# Patient Record
Sex: FEMALE | Race: WHITE | NOT HISPANIC OR LATINO | Employment: FULL TIME | URBAN - METROPOLITAN AREA
[De-identification: names, ages, dates, MRNs, and addresses within clinical notes are randomized per-mention and may not be internally consistent; named-entity substitution may affect disease eponyms.]

---

## 2017-07-18 ENCOUNTER — ALLSCRIPTS OFFICE VISIT (OUTPATIENT)
Dept: OTHER | Facility: OTHER | Age: 47
End: 2017-07-18

## 2017-10-04 ENCOUNTER — ALLSCRIPTS OFFICE VISIT (OUTPATIENT)
Dept: OTHER | Facility: OTHER | Age: 47
End: 2017-10-04

## 2017-10-04 DIAGNOSIS — Z12.31 ENCOUNTER FOR SCREENING MAMMOGRAM FOR MALIGNANT NEOPLASM OF BREAST: ICD-10-CM

## 2017-10-17 ENCOUNTER — LAB CONVERSION - ENCOUNTER (OUTPATIENT)
Dept: OTHER | Facility: OTHER | Age: 47
End: 2017-10-17

## 2017-10-17 LAB
CHOLEST SERPL-MCNC: 177 MG/DL
GLUCOSE SERPL-MCNC: 93 MG/DL
HDLC SERPL-MCNC: 56 MG/DL
LDLC SERPL CALC-MCNC: 111 MG/DL
TRIGL SERPL-MCNC: 49 MG/DL

## 2017-12-01 ENCOUNTER — GENERIC CONVERSION - ENCOUNTER (OUTPATIENT)
Dept: FAMILY MEDICINE CLINIC | Facility: CLINIC | Age: 47
End: 2017-12-01

## 2017-12-04 DIAGNOSIS — R92.8 OTHER ABNORMAL AND INCONCLUSIVE FINDINGS ON DIAGNOSTIC IMAGING OF BREAST: ICD-10-CM

## 2017-12-12 ENCOUNTER — GENERIC CONVERSION - ENCOUNTER (OUTPATIENT)
Dept: FAMILY MEDICINE CLINIC | Facility: CLINIC | Age: 47
End: 2017-12-12

## 2018-01-11 NOTE — RESULT NOTES
Verified Results  (1923 [facility] TgAb+Thyroglobulin,DESTINEY or NILSA [id number] 10:32AM Kenya Patel courtesy copy of this report has been sent to  [facility]  Test Name Result Flag Reference   Thyroglobulin Antibody Thyroglobulin Ab 1936 8 IU/mL H 0 0-0 9   Thyroglobulin Antibody measured by [employer]     (LC) Thyroglobulin by NILSA [id number] 10:32AM Donna Garcia     Test Name Result Flag Reference   Thyroglobulin by NILSA Thyroglobulin 7 1 ng/mL     Reference Range:  Pubertal Children  and Adults: <40  According to the [facility] of Clinical Biochemistry,  the reference interval for Thyroglobulin (TG) should be  related to euthyroid patients and not for patients who  underwent thyroidectomy  TG reference intervals for these  patients depend on the residual mass of the thyroid tissue  left after surgery  Establishing a post-operative baseline  is recommended  The assay quantitation limit is 2 0 ng/mL         Discussion/Summary   follow up with endocrinolgy as discussed for further work up and treatment plan

## 2018-01-12 ENCOUNTER — GENERIC CONVERSION - ENCOUNTER (OUTPATIENT)
Dept: FAMILY MEDICINE CLINIC | Facility: CLINIC | Age: 48
End: 2018-01-12

## 2018-01-12 NOTE — RESULT NOTES
Verified Results  US THYROID [id number] 01:24PM Gonzalez Garcia     Test Name Result Flag Reference   US THYROID (Report)     THYROID ULTRASOUND     INDICATION: Hypothyroidism     COMPARISON: None  TECHNIQUE:  Ultrasound of the thyroid was performed with a high frequency linear transducer in transverse and sagittal planes including volumetric imaging sweeps as well as traditional still imaging technique  FINDINGS:   Both lobes are enlarged and heterogeneous  There are also mildly hypervascular  Right gland: 5 1 x 2 1 x 1 9 cm  Marked heterogeneity suggests nodules although I do not believe a discrete nodule is present  Left gland: 5 0 x 1 8 x 1 7 cm  No dominant nodules  Isthmus: 0 8 cm in AP dimension  No dominant nodules  Markedly heterogeneous thyroid gland compatible with a goiter  Mild thyroidomegaly

## 2018-01-13 NOTE — RESULT NOTES
Verified Results  (1) T3 TOTAL [id number] 10:32AM Delmonico, Cathye Fleischer     Test Name Result Flag Reference   Triiodothyronine (T3) 117 ng/dL       (1) TSH [id number] 10:32AM Delmonico, Cathye Fleischer     Test Name Result Flag Reference   TSH 2 650 uIU/mL  0 450-4 500     [facility]) Thyroid [facility] Profile [id number] 10:32AM Delmonico, Cathye Fleischer     Test Name Result Flag Reference   TSH 2 400 uIU/mL  0 450-4 500     [facility]) Thyroxine (T4) Free, [facility] [id number] 10:32AM Delmonico, Cathye Fleischer     Test Name Result Flag Reference   T4,Free(Direct) 1 79 ng/dL H 0 82-1 77     (LC) Thyroid Antibodies [id number] 10:32AM Delmonico, Cathye Fleischer     Test Name Result Flag Reference   Thyroid Peroxidase (TPO) Ab 384 IU/mL H 0-34       Discussion/Summary   Thyroid tests abnormal  follow with endocrinology for further evaluation as discussed

## 2018-01-14 VITALS
SYSTOLIC BLOOD PRESSURE: 124 MMHG | HEART RATE: 76 BPM | DIASTOLIC BLOOD PRESSURE: 90 MMHG | RESPIRATION RATE: 16 BRPM | HEIGHT: 65 IN | WEIGHT: 163 LBS | BODY MASS INDEX: 27.16 KG/M2 | TEMPERATURE: 98 F

## 2018-01-14 NOTE — PROGRESS NOTES
Assessment    1  Encounter for routine history and physical exam in female (V70 0) (Z00 00)    Plan  Acute maxillary sinusitis    · Azithromycin 250 MG Oral Tablet; Take as directed per package instructions  Encounter for screening for cardiovascular disorders, Hypothyroidism    · (1) COMPREHENSIVE METABOLIC PANEL; Status:Active; Requested for:04Oct2017;    · (1) LIPID PANEL, FASTING; Status:Active; Requested for:04Oct2017;    · (1) TSH; Status:Canceled;   Encounter for screening mammogram for malignant neoplasm of breast    · * MAMMO SCREENING BILATERAL W CAD; Status:Hold For - Scheduling,Retrospective  By Protocol Authorization; Requested OBE:[id number]; Health Maintenance    · Begin a limited exercise program ; Status:Complete;   Done: 07PZO6853   · Brush your teeth 3 times a day and floss at least once a day ; Status:Complete;   Done:  04ZNV2334   · Decreasing the stress in your life may help your condition improve ; Status:Complete;    Done: 28JXD1272   · Some eating tips that can help you lose weight ; Status:Complete;   Done: 59HRX8948   · Vitamins can help you get daily requirements that your diet may not be giving you ;  Status:Complete;   Done: 49IZM2586   · We recommend that you follow these steps to lower your risk of osteoporosis  ;  Status:Complete;   Done: 31KRA0909   · Call [phone] if: You find a new or different kind of lump in your breast ;  Status:Complete;   Done: 72MWS4222   · Call [phone] if: You have any warning signs of skin cancer ; Status:Complete;    Done: 88LMU0610  Hypothyroidism    · Follow-up visit in 6 weeks Evaluation and Treatment  Follow-up  Status: Hold For -  Scheduling  Requested for: 04Oct2017  Situational anxiety    · DULoxetine HCl - 30 MG Oral Capsule Delayed Release Particles; TAKE ONE  CAPSULE BY MOUTH ONCE DAILY    Discussion/Summary  healthy adult female Currently, she eats a healthy diet   Breast cancer screening: the risks and benefits of breast cancer screening were discussed and mammogram has been ordered  Osteoporosis screening: the risks and benefits of osteoporosis screening were discussed  The risks and benefits of immunizations were discussed  Advice and education were given regarding calcium supplements and vitamin D supplements  Patient discussion: discussed with the patient  Labs as ordered  Continue medications  Gi follow up GYN pap test as needed  Chief Complaint  Pt here for a CPE  sp/cma      History of Present Illness  HM, Adult Female: The patient is being seen for a health maintenance evaluation  The last health maintenance visit was 2 year(s) ago  General Health: She has regular dental visits  She complains of vision problems  She denies hearing loss  Lifestyle:  She does not use tobacco  She denies drug use  Reproductive health: the patient is postmenopausal   she is sexually active  hysterectomy  Screening: Additional History:  stress depression  Review of Systems    Eyes: no eyesight problems  ENT: nasal discharge  Cardiovascular: no palpitations  Respiratory: no cough and no wheezing  Genitourinary: no pelvic pain  Endocrine: no hot flashes  Hematologic/Lymphatic: no swollen glands  Other Symptoms: stress anxiety  Active Problems    1  Acute maxillary sinusitis (461 0) (J01 00)   2  Acute upper respiratory infection (465 9) (J06 9)   3  Allergic rhinitis (477 9) (J30 9)   4  Amenorrhea (626 0) (N91 2)   5  Anxiety disorder (300 00) (F41 9)   6  Arthropathy (716 90) (M12 9)   7  Asthma (493 90) (J45 909)   8  Asthma with exacerbation (493 92) (J45 901)   9  Depression (311) (F32 9)   10  Diffuse nontoxic goiter (240 9) (E04 0)   11  Encounter for routine pelvic examination (V72 31) (Z01 419)   12  Encounter for screening for cardiovascular disorders (V81 2) (Z13 6)   13  Encounter for screening mammogram for malignant neoplasm of breast (V76 12)    (Z12 31)   14   Herpes gingivostomatitis (054 2) (B00 2)   15  Herpes simplex infection (054 9) (B00 9)   16  Hypothyroidism (244 9) (E03 9)   17  Need for influenza vaccination (V04 81) (Z23)   18  Visit for suture removal (V58 32) (Z48 02)    Past Medical History    · Acute bronchitis (466 0) (J20 9)   · Acute maxillary sinusitis (461 0) (J01 00)   · Acute maxillary sinusitis (461 0) (J01 00)   · History of Cellulitis of trunk (682 2) (L03 319)   · History of Extrinsic asthma, with acute exacerbation (493 02) (J45 901)   · History of acute bronchitis (V12 69) (Z87 09)   · History of acute sinusitis (V12 69) (Z87 09)   · History of acute sinusitis (V12 69) (Z87 09)   · History of acute sinusitis (V12 69) (Z87 09)   · History of acute sinusitis (V12 69) (Z87 09)   · History of acute sinusitis (V12 69) (Z87 09)   · History of lymphadenopathy (V13 89) (X61 045)   · History of neoplasm of uncertain behavior of skin (V13 3) (Z86 03)   · History of Mastodynia (611 71) (N64 4)   · History of Open Wound Of The Hand (882 0)   · History of Other muscle spasm (728 85) (Q61 062)   · History of Urinary Tract Infection (V13 02)    Surgical History    · History of Hysterectomy   · History of Nasal Septal Deviation Repair    Family History  Mother    · Family history of hypothyroidism (V18 19) (Z83 49)   · Family history of Rheumatoid Arthritis    Social History    · Never a smoker   · Denied: Never A Smoker    Current Meds   1  Allegra 180 MG TABS; take 1 tablet daily as needed Recorded   2  Levocetirizine Dihydrochloride 5 MG Oral Tablet; 1 every day; Therapy: 49WFJ0859 to (Last Rx:10Jan2013)  Requested for: 33ZVO4165 Ordered   3  Levothyroxine Sodium 150 MCG Oral Tablet; 1 every day; Therapy: 54RLF0663 to (Evaluate:18Jun2016)  Requested for: 12Jan2016; Last   Rx:12Kyf4959 Ordered   4  ProAir  (90 Base) MCG/ACT Inhalation Aerosol Solution; 2 puffs QID Prn;   Therapy: 45ODK7154 to (Last UN:42VXG8306)  Requested for: 51WWN9381 Ordered    Allergies    1   No Known Drug Allergies    Vitals   Recorded: 45FLX7528 05:45PM Recorded: 53MKK0922 05:31PM   Temperature  98 F   Heart Rate  76   Respiration  16   Systolic 127 163   Diastolic 90 98   Height  5 ft 5 in   Weight  163 lb    BMI Calculated  27 12   BSA Calculated  1 81   LMP  73Ltp8209     Physical Exam    Constitutional   General appearance: No acute distress, well appearing and well nourished  Head and Face   Head and face: Normal     Palpation of the face and sinuses: No sinus tenderness  Eyes   Conjunctiva and lids: No swelling, erythema or discharge  Pupils and irises: Equal, round, reactive to light  Ophthalmoscopic examination: Normal fundi and optic discs  Ears, Nose, Mouth, and Throat   Otoscopic examination: Tympanic membranes translucent with normal light reflex  Canals patent without erythema  Hearing: Normal     Lips, teeth, and gums: Normal, good dentition  Oropharynx: Normal with no erythema, edema, exudate or lesions  Neck fullness R>L  Pulmonary   Auscultation of lungs: Clear to auscultation  Cardiovascular   Carotid pulses: 2+ bilaterally  Pedal pulses: 2+ bilaterally  Examination of extremities for edema and/or varicosities: Normal     Abdomen   Abdomen: Non-tender, no masses  Liver and spleen: No hepatomegaly or splenomegaly  Musculoskeletal   Gait and station: Normal     Digits and nails: Normal without clubbing or cyanosis  Range of motion: Normal     Stability: Normal     Muscle strength/tone: Normal     Skin fibroma's lentigines back chest    Palpation of skin and subcutaneous tissue: Normal turgor  Psychiatric   Judgment and insight: Normal     Orientation to person, place, and time: Normal     Recent and remote memory: Intact  Mood and affect: Normal        Procedure    Procedure: Visual Acuity Test    Indication: routine screening  Inforrmation supplied by sp/cma     Results: 20/15 in both eyes without corrective device, 20/20 in the right eye without corrective device, 20/20 in the left eye without corrective device   Color vision was reported by sp/cma and the results were normal       Signatures   Electronically signed by : Belinda Bridges MD; Oct  4 2017  6:02PM EST                       (Author)

## 2018-01-15 VITALS
RESPIRATION RATE: 18 BRPM | HEIGHT: 65 IN | WEIGHT: 166 LBS | SYSTOLIC BLOOD PRESSURE: 144 MMHG | HEART RATE: 80 BPM | BODY MASS INDEX: 27.66 KG/M2 | DIASTOLIC BLOOD PRESSURE: 80 MMHG | TEMPERATURE: 97.7 F

## 2018-01-15 NOTE — MISCELLANEOUS
Message  patient here with her parents  Feeling sick for the past week  She has sinus pressure and a sore throat        Plan  Acute maxillary sinusitis    · Azithromycin 250 MG Oral Tablet; 2 pills today, then 1 pill daily for 4 days; advised  to hold his cholesterol medication while taking the antibiotic    Signatures   Electronically signed by : Pat Trujillo DO; Dec 13 2016 10:25AM EST                       (Author)

## 2018-02-12 DIAGNOSIS — J40 BRONCHITIS: Primary | ICD-10-CM

## 2018-02-12 RX ORDER — AZITHROMYCIN 250 MG/1
250 TABLET, FILM COATED ORAL SEE ADMIN INSTRUCTIONS
Qty: 6 TABLET | Refills: 0 | Status: SHIPPED | OUTPATIENT
Start: 2018-02-12 | End: 2018-02-17

## 2018-02-12 RX ORDER — BENZONATATE 200 MG/1
200 CAPSULE ORAL 3 TIMES DAILY PRN
Qty: 20 CAPSULE | Refills: 0 | Status: SHIPPED | OUTPATIENT
Start: 2018-02-12 | End: 2018-06-13 | Stop reason: ALTCHOICE

## 2018-02-14 DIAGNOSIS — J40 BRONCHITIS: Primary | ICD-10-CM

## 2018-02-14 RX ORDER — CEFUROXIME AXETIL 500 MG/1
250 TABLET ORAL EVERY 12 HOURS SCHEDULED
Qty: 10 TABLET | Refills: 0 | Status: SHIPPED | OUTPATIENT
Start: 2018-02-14 | End: 2018-02-24

## 2018-03-30 ENCOUNTER — TELEPHONE (OUTPATIENT)
Dept: FAMILY MEDICINE CLINIC | Facility: CLINIC | Age: 48
End: 2018-03-30

## 2018-05-22 DIAGNOSIS — F43.23 SITUATIONAL MIXED ANXIETY AND DEPRESSIVE DISORDER: Primary | ICD-10-CM

## 2018-05-22 PROBLEM — F41.8 SITUATIONAL ANXIETY: Status: ACTIVE | Noted: 2017-10-04

## 2018-05-22 RX ORDER — DULOXETIN HYDROCHLORIDE 30 MG/1
30 CAPSULE, DELAYED RELEASE ORAL DAILY
Qty: 30 CAPSULE | Refills: 1 | Status: SHIPPED | OUTPATIENT
Start: 2018-05-22 | End: 2018-07-29 | Stop reason: SDUPTHER

## 2018-05-22 RX ORDER — DULOXETIN HYDROCHLORIDE 30 MG/1
1 CAPSULE, DELAYED RELEASE ORAL DAILY
COMMUNITY
Start: 2017-10-04 | End: 2018-05-22 | Stop reason: SDUPTHER

## 2018-06-13 ENCOUNTER — OFFICE VISIT (OUTPATIENT)
Dept: FAMILY MEDICINE CLINIC | Facility: CLINIC | Age: 48
End: 2018-06-13
Payer: COMMERCIAL

## 2018-06-13 VITALS
TEMPERATURE: 98.2 F | HEART RATE: 76 BPM | DIASTOLIC BLOOD PRESSURE: 80 MMHG | SYSTOLIC BLOOD PRESSURE: 118 MMHG | RESPIRATION RATE: 16 BRPM | WEIGHT: 171 LBS | BODY MASS INDEX: 28.46 KG/M2

## 2018-06-13 DIAGNOSIS — H69.81 EUSTACHIAN TUBE DYSFUNCTION, RIGHT: Primary | ICD-10-CM

## 2018-06-13 DIAGNOSIS — M77.8 TENDONITIS OF ELBOW, RIGHT: ICD-10-CM

## 2018-06-13 DIAGNOSIS — Z13.6 SCREENING FOR CARDIOVASCULAR CONDITION: ICD-10-CM

## 2018-06-13 DIAGNOSIS — E03.9 ACQUIRED HYPOTHYROIDISM: ICD-10-CM

## 2018-06-13 PROBLEM — J45.20 MILD INTERMITTENT ASTHMA WITHOUT COMPLICATION: Status: ACTIVE | Noted: 2018-06-13

## 2018-06-13 PROCEDURE — 99213 OFFICE O/P EST LOW 20 MIN: CPT | Performed by: FAMILY MEDICINE

## 2018-06-13 RX ORDER — LEVOCETIRIZINE DIHYDROCHLORIDE 5 MG/1
TABLET, FILM COATED ORAL DAILY
COMMUNITY
Start: 2013-01-10

## 2018-06-13 RX ORDER — ALBUTEROL SULFATE 90 UG/1
2 AEROSOL, METERED RESPIRATORY (INHALATION) 4 TIMES DAILY PRN
COMMUNITY
Start: 2011-12-20

## 2018-06-13 RX ORDER — SULINDAC 150 MG/1
150 TABLET ORAL 2 TIMES DAILY
Qty: 30 TABLET | Refills: 1 | Status: SHIPPED | OUTPATIENT
Start: 2018-06-13 | End: 2021-10-29 | Stop reason: ALTCHOICE

## 2018-06-13 RX ORDER — LEVOTHYROXINE SODIUM 0.15 MG/1
TABLET ORAL DAILY
COMMUNITY
Start: 2013-05-13 | End: 2018-10-08 | Stop reason: SDUPTHER

## 2018-06-13 RX ORDER — FEXOFENADINE HCL 180 MG/1
1 TABLET ORAL DAILY PRN
COMMUNITY
End: 2018-06-13 | Stop reason: SDUPTHER

## 2018-06-13 NOTE — PROGRESS NOTES
Subjective:           Problem List Items Addressed This Visit     Hypothyroidism    Relevant Orders    TSH, 3rd generation  Cont medication, labs poss US    Comprehensive metabolic panel      Other Visit Diagnoses     Eustachian tube dysfunction, right    -  Primary  See below    Tendonitis of elbow, right     PT if worsens    Relevant Medications    sulindac (CLINORIL) 150 MG tablet    Screening for cardiovascular condition        Relevant Orders    Lipid panel              Orders Placed This Encounter   Procedures    TSH, 3rd generation     This is a patient instruction: This test is non-fasting  Please drink two glasses of water morning of bloodwork  Standing Status:   Future     Standing Expiration Date:   6/13/2019    Comprehensive metabolic panel     This is a patient instruction: Patient fasting for 8 hours or longer recommended  Standing Status:   Future     Standing Expiration Date:   6/13/2019    Lipid panel     This is a patient instruction: This test requires patient fasting for 10-12 hours or longer  Drinking of black coffee or black tea is acceptable  Standing Status:   Future     Standing Expiration Date:   6/13/2019       Patient Instructions   Limit lifting/twisting R elbow  warm compress 20 min twice daily  PT if persists  afrin 1 spray daily x4 days  flonase 1 spray daily x 7 days rinse well  Sanderson Corporal      HPI R ear fullness pain  Since recent flight  R Elbow pain on and off 1 month , no known trauma      Vitals:    06/13/18 1849   BP: 118/80   Pulse: 76   Resp: 16   Temp: 98 2 °F (36 8 °C)       No Known Allergies    Current Outpatient Prescriptions on File Prior to Visit   Medication Sig Dispense Refill    DULoxetine (CYMBALTA) 30 mg delayed release capsule Take 1 capsule (30 mg total) by mouth daily for 60 days 30 capsule 1    [DISCONTINUED] benzonatate (TESSALON) 200 MG capsule Take 1 capsule (200 mg total) by mouth 3 (three) times a day as needed for cough 20 capsule 0     No current facility-administered medications on file prior to visit  No past medical history on file  Past Surgical History:   Procedure Laterality Date    HYSTERECTOMY      NASAL SEPTUM SURGERY            reports that she has never smoked  She has never used smokeless tobacco  She reports that she drinks alcohol  She reports that she does not use drugs  reports that she has never smoked  She has never used smokeless tobacco    Past med fam soc allergy reviewed    Review of Systems   Constitutional: Negative for chills and fatigue  HENT: Positive for ear pain  Negative for congestion, sinus pain and sore throat  R ear   Gastrointestinal: Negative for abdominal pain  Genitourinary: Negative for hematuria  Musculoskeletal: Positive for arthralgias  Negative for back pain  R elbow pain  Neurological: Negative for dizziness, tremors and syncope  Psychiatric/Behavioral: Negative for agitation  Physical Exam   Constitutional: She is oriented to person, place, and time  She appears well-developed and well-nourished  No distress  HENT:   Head: Normocephalic and atraumatic    r tm retracted  tender ant  No mass no adenopathy   Eyes: EOM are normal  Pupils are equal, round, and reactive to light  No scleral icterus  Neck: Neck supple  Thyroid prominent R>L no defined nodule   Cardiovascular:   No murmur heard  Pulmonary/Chest: Effort normal and breath sounds normal    Abdominal: Soft  Musculoskeletal:   Tender R elbow  ROM full  N/V intact   Neurological: She is alert and oriented to person, place, and time  Skin: Skin is warm and dry

## 2018-06-13 NOTE — PATIENT INSTRUCTIONS
Limit lifting/twisting R elbow  warm compress 20 min twice daily  PT if persists  afrin 1 spray daily x4 days  flonase 1 spray daily x 7 days rinse well

## 2018-07-29 DIAGNOSIS — F43.23 SITUATIONAL MIXED ANXIETY AND DEPRESSIVE DISORDER: ICD-10-CM

## 2018-07-30 RX ORDER — DULOXETIN HYDROCHLORIDE 30 MG/1
30 CAPSULE, DELAYED RELEASE ORAL DAILY
Qty: 30 CAPSULE | Refills: 1 | Status: SHIPPED | OUTPATIENT
Start: 2018-07-30 | End: 2018-10-08 | Stop reason: SDUPTHER

## 2018-09-12 DIAGNOSIS — J40 BRONCHITIS: Primary | ICD-10-CM

## 2018-09-12 RX ORDER — DOXYCYCLINE 100 MG/1
100 CAPSULE ORAL 2 TIMES DAILY
Qty: 7 CAPSULE | Refills: 0 | Status: SHIPPED | OUTPATIENT
Start: 2018-09-12 | End: 2018-09-19

## 2018-09-21 ENCOUNTER — OFFICE VISIT (OUTPATIENT)
Dept: FAMILY MEDICINE CLINIC | Facility: CLINIC | Age: 48
End: 2018-09-21
Payer: COMMERCIAL

## 2018-09-21 ENCOUNTER — TELEPHONE (OUTPATIENT)
Dept: FAMILY MEDICINE CLINIC | Facility: CLINIC | Age: 48
End: 2018-09-21

## 2018-09-21 VITALS
DIASTOLIC BLOOD PRESSURE: 88 MMHG | WEIGHT: 167 LBS | BODY MASS INDEX: 27.82 KG/M2 | TEMPERATURE: 97.6 F | RESPIRATION RATE: 16 BRPM | SYSTOLIC BLOOD PRESSURE: 140 MMHG | HEART RATE: 82 BPM | HEIGHT: 65 IN

## 2018-09-21 DIAGNOSIS — R05.9 COUGH: ICD-10-CM

## 2018-09-21 DIAGNOSIS — J01.40 ACUTE NON-RECURRENT PANSINUSITIS: ICD-10-CM

## 2018-09-21 DIAGNOSIS — J06.9 UPPER RESPIRATORY TRACT INFECTION, UNSPECIFIED TYPE: Primary | ICD-10-CM

## 2018-09-21 PROBLEM — R11.0 MILD NAUSEA: Status: ACTIVE | Noted: 2018-01-23

## 2018-09-21 PROBLEM — R92.8 ABNORMAL MAMMOGRAM OF RIGHT BREAST: Status: ACTIVE | Noted: 2017-12-04

## 2018-09-21 PROCEDURE — 99213 OFFICE O/P EST LOW 20 MIN: CPT | Performed by: NURSE PRACTITIONER

## 2018-09-21 PROCEDURE — 3008F BODY MASS INDEX DOCD: CPT | Performed by: NURSE PRACTITIONER

## 2018-09-21 RX ORDER — VALACYCLOVIR HYDROCHLORIDE 1 G/1
TABLET, FILM COATED ORAL
Refills: 5 | COMMUNITY
Start: 2018-07-30 | End: 2019-08-12 | Stop reason: SDUPTHER

## 2018-09-21 RX ORDER — CEFUROXIME AXETIL 500 MG/1
500 TABLET ORAL EVERY 12 HOURS SCHEDULED
Qty: 14 TABLET | Refills: 0 | Status: SHIPPED | OUTPATIENT
Start: 2018-09-21 | End: 2018-09-28

## 2018-09-21 RX ORDER — BENZONATATE 200 MG/1
200 CAPSULE ORAL 3 TIMES DAILY PRN
Qty: 20 CAPSULE | Refills: 0 | Status: SHIPPED | OUTPATIENT
Start: 2018-09-21 | End: 2021-10-29 | Stop reason: ALTCHOICE

## 2018-09-21 RX ORDER — DOXYCYCLINE HYCLATE 100 MG/1
CAPSULE ORAL
Refills: 0 | COMMUNITY
Start: 2018-07-03 | End: 2021-10-29 | Stop reason: ALTCHOICE

## 2018-09-21 NOTE — TELEPHONE ENCOUNTER
If she is feeling this sick (feeling that she needs 2 weeks of antibiotics) she should really been seen for follow up evaluation  I apologize for the inconvenience  Thanks!

## 2018-09-21 NOTE — TELEPHONE ENCOUNTER
Patient called states feeling better but  Is still feeling  Very sick  And  phlegm  Is still there with cough  would like to know  If you could please prescribe another course of abx  af/rma

## 2018-09-21 NOTE — ASSESSMENT & PLAN NOTE
Continue symptomatic care with fluids and rest  Return to the office if no improvement in 3-4 days while on ceftin

## 2018-09-21 NOTE — PROGRESS NOTES
Assessment/Plan:    Problem List Items Addressed This Visit     Upper respiratory tract infection - Primary     Continue symptomatic care with fluids and rest  Return to the office if no improvement in 3-4 days while on ceftin         Relevant Medications    cefuroxime (CEFTIN) 500 mg tablet    Acute non-recurrent pansinusitis    Relevant Medications    cefuroxime (CEFTIN) 500 mg tablet      Other Visit Diagnoses     Cough        Relevant Medications    benzonatate (TESSALON) 200 MG capsule        Patient Instructions   Increase fluid intake as tolerated  Rest and humidification   Continue medications as directed   - antibiotic for full course  - pro-biotic to protect stomach while on medication   - Flonase OTC 1-2 sprays each nostril daily PRN post nasal drip   - Mucinex OTC to loosen secretions   Return to office in one week if symptoms persist or worsen          Return in about 4 days (around 9/25/2018), or if symptoms worsen or fail to improve  Subjective:      Patient ID: Socorro Chacko is a 57 y o  female  Chief Complaint   Patient presents with   Markham Muscat Like Symptoms     since last week    Nasal Congestion    Cough    Tammie Gonsalez is a 57year old female who presents to the office for evaluation of cough, sinus congestion greater on the right side, hoarse voice and fatigue x's 8-9 days  Also c/o bilateral ear discomfort with right ear itching  Reports she took one week of doxycycline without improvement  Cough is productive in the morning and then dry during the day and at night  Nausea in the morning with her cough  Right side of her face has pressure and mild pain  No improvement on doxycycline, zyzal, sudafed  Denies fevers but reports chills         The following portions of the patient's history were reviewed and updated as appropriate: allergies, current medications, past family history, past medical history, past social history, past surgical history and problem list     Review of Systems   Constitutional: Positive for chills and fatigue  Negative for diaphoresis and fever  HENT: Positive for congestion, ear pain, postnasal drip, rhinorrhea and sinus pressure  Negative for ear discharge, sinus pain and sore throat  Eyes: Negative for pain and discharge  Respiratory: Positive for cough  Negative for chest tightness, shortness of breath and wheezing  Cardiovascular: Negative for chest pain  Gastrointestinal: Negative for diarrhea, nausea and vomiting  Genitourinary: Negative for dysuria  Musculoskeletal: Negative for myalgias  Skin: Negative for rash  Current Outpatient Prescriptions   Medication Sig Dispense Refill    albuterol (PROAIR HFA) 90 mcg/act inhaler Inhale 2 puffs 4 (four) times a day as needed      DULoxetine (CYMBALTA) 30 mg delayed release capsule TAKE 1 CAPSULE (30 MG TOTAL) BY MOUTH DAILY FOR 60 DAYS 30 capsule 1    levocetirizine (XYZAL) 5 MG tablet Take by mouth daily      levothyroxine 150 mcg tablet Take by mouth daily      valACYclovir (VALTREX) 1,000 mg tablet   5    benzonatate (TESSALON) 200 MG capsule Take 1 capsule (200 mg total) by mouth 3 (three) times a day as needed for cough 20 capsule 0    cefuroxime (CEFTIN) 500 mg tablet Take 1 tablet (500 mg total) by mouth every 12 (twelve) hours for 7 days 14 tablet 0    doxycycline hyclate (VIBRAMYCIN) 100 mg capsule   0    sulindac (CLINORIL) 150 MG tablet Take 1 tablet (150 mg total) by mouth 2 (two) times a day (Patient not taking: Reported on 9/21/2018 ) 30 tablet 1     No current facility-administered medications for this visit  Objective:    /88   Pulse 82   Temp 97 6 °F (36 4 °C)   Resp 16   Ht 5' 5" (1 651 m)   Wt 75 8 kg (167 lb)   BMI 27 79 kg/m²        Physical Exam   Constitutional: She is oriented to person, place, and time  She appears well-developed and well-nourished  HENT:   Head: Normocephalic and atraumatic     Right Ear: Hearing normal  There is drainage  No swelling or tenderness  Tympanic membrane is erythematous  No middle ear effusion  Left Ear: Hearing normal  There is drainage and swelling  No tenderness  Tympanic membrane is erythematous  No middle ear effusion  Nose: Rhinorrhea present  No mucosal edema  Mouth/Throat: Posterior oropharyngeal erythema present  No oropharyngeal exudate or posterior oropharyngeal edema  Eyes: Conjunctivae are normal  Pupils are equal, round, and reactive to light  Neck: Normal range of motion  Neck supple  No thyromegaly present  Cardiovascular: Normal rate, regular rhythm and normal heart sounds  Pulmonary/Chest: Effort normal  No respiratory distress  She has no decreased breath sounds  She has no wheezes  She has rhonchi in the right lower field and the left lower field  Abdominal: Soft  Bowel sounds are normal  She exhibits no distension  There is no tenderness  There is no rebound  Lymphadenopathy:     She has no cervical adenopathy  Neurological: She is alert and oriented to person, place, and time  Skin: Skin is warm and dry  No rash noted  Psychiatric: She has a normal mood and affect   Her behavior is normal  Thought content normal          GEMINI Forte

## 2018-10-08 DIAGNOSIS — F43.23 SITUATIONAL MIXED ANXIETY AND DEPRESSIVE DISORDER: ICD-10-CM

## 2018-10-08 DIAGNOSIS — E03.9 ACQUIRED HYPOTHYROIDISM: Primary | ICD-10-CM

## 2018-10-08 RX ORDER — LEVOTHYROXINE SODIUM 0.15 MG/1
150 TABLET ORAL DAILY
Qty: 90 TABLET | Refills: 0 | Status: SHIPPED | OUTPATIENT
Start: 2018-10-08 | End: 2019-01-29 | Stop reason: SDUPTHER

## 2018-10-08 RX ORDER — DULOXETIN HYDROCHLORIDE 30 MG/1
30 CAPSULE, DELAYED RELEASE ORAL DAILY
Qty: 90 CAPSULE | Refills: 3 | Status: SHIPPED | OUTPATIENT
Start: 2018-10-08 | End: 2019-10-01 | Stop reason: SDUPTHER

## 2018-12-04 ENCOUNTER — OFFICE VISIT (OUTPATIENT)
Dept: FAMILY MEDICINE CLINIC | Facility: CLINIC | Age: 48
End: 2018-12-04
Payer: COMMERCIAL

## 2018-12-04 VITALS
SYSTOLIC BLOOD PRESSURE: 130 MMHG | WEIGHT: 169 LBS | HEIGHT: 65 IN | TEMPERATURE: 98 F | BODY MASS INDEX: 28.16 KG/M2 | HEART RATE: 96 BPM | DIASTOLIC BLOOD PRESSURE: 80 MMHG | RESPIRATION RATE: 18 BRPM

## 2018-12-04 DIAGNOSIS — H69.93 DISORDER OF BOTH EUSTACHIAN TUBES: ICD-10-CM

## 2018-12-04 DIAGNOSIS — H92.09 OTALGIA, UNSPECIFIED LATERALITY: Primary | ICD-10-CM

## 2018-12-04 PROCEDURE — 3008F BODY MASS INDEX DOCD: CPT | Performed by: FAMILY MEDICINE

## 2018-12-04 PROCEDURE — 99213 OFFICE O/P EST LOW 20 MIN: CPT | Performed by: FAMILY MEDICINE

## 2018-12-04 NOTE — PROGRESS NOTES
Subjective:           Problem List Items Addressed This Visit     Otalgia - Primary    Disorder of both eustachian tubes              No orders of the defined types were placed in this encounter  Patient Instructions   Afrin 1 spray each nostril daily 4    Flonase 1 spray daily x 7 days rinse after use      Vi Arango is in with ear pain past few days some hearing loss no dizziness no fever  She has a history of mastodynia, herpes simplex, hypothyroidism, asthma    Vitals:    12/04/18 1620   BP: 130/80   Pulse: 96   Resp: 18   Temp: 98 °F (36 7 °C)       No Known Allergies    Current Outpatient Prescriptions on File Prior to Visit   Medication Sig Dispense Refill    albuterol (PROAIR HFA) 90 mcg/act inhaler Inhale 2 puffs 4 (four) times a day as needed      doxycycline hyclate (VIBRAMYCIN) 100 mg capsule   0    DULoxetine (CYMBALTA) 30 mg delayed release capsule Take 1 capsule (30 mg total) by mouth daily for 90 days 90 capsule 3    levocetirizine (XYZAL) 5 MG tablet Take by mouth daily      levothyroxine 150 mcg tablet Take 1 tablet (150 mcg total) by mouth daily for 90 days 90 tablet 0    valACYclovir (VALTREX) 1,000 mg tablet   5    benzonatate (TESSALON) 200 MG capsule Take 1 capsule (200 mg total) by mouth 3 (three) times a day as needed for cough (Patient not taking: Reported on 12/4/2018 ) 20 capsule 0    sulindac (CLINORIL) 150 MG tablet Take 1 tablet (150 mg total) by mouth 2 (two) times a day (Patient not taking: Reported on 9/21/2018 ) 30 tablet 1     No current facility-administered medications on file prior to visit  Past Medical History:   Diagnosis Date    Asthma     Extrinsic asthma with acute exacerbation   Onset date: 10/10/2011     Lymphadenopathy     Last assessed 6/18/2008    Mastodynia     Last assessed 6/15/2009     Neoplasm of uncertain behavior of skin     Last assessed 9/13/2011     Open wound of hand     Resolved 9/11/2014        Past Surgical History:   Procedure Laterality Date    HYSTERECTOMY      NASAL SEPTUM SURGERY            reports that she has never smoked  She has never used smokeless tobacco  She reports that she drinks alcohol  She reports that she does not use drugs  reports that she has never smoked  She has never used smokeless tobacco     The following portions of the patient's history were reviewed and updated as appropriate: allergies, current medications, past family history, past medical history, past social history, past surgical history and problem list     Review of Systems   Constitutional: Negative for diaphoresis  HENT: Positive for ear pain  Negative for facial swelling, hearing loss, nosebleeds, sneezing and tinnitus  Eyes: Negative for photophobia and redness  Respiratory: Negative for apnea, choking, shortness of breath and wheezing  Cardiovascular: Negative for chest pain  Gastrointestinal: Negative for abdominal distention  Genitourinary: Negative for hematuria and vaginal bleeding  Musculoskeletal: Negative for arthralgias  Neurological: Negative for dizziness, tremors, facial asymmetry, speech difficulty, numbness and headaches  Psychiatric/Behavioral: Negative for agitation  Situational anxiety       Physical Exam   Constitutional: She is oriented to person, place, and time  She appears well-developed and well-nourished  No distress  HENT:   Head: Normocephalic and atraumatic    r tm retracted  tender ant  No mass no adenopathy   Eyes: Pupils are equal, round, and reactive to light  EOM are normal  No scleral icterus  Neck: Neck supple  Thyroid prominent R>L no defined nodule   Cardiovascular:   No murmur heard  Pulmonary/Chest: Effort normal and breath sounds normal    Abdominal: Soft  Musculoskeletal:     ROM full  N/V intact   Neurological: She is alert and oriented to person, place, and time  Skin: Skin is warm and dry  Capillary refill takes less than 2 seconds  Psychiatric: She has a normal mood and affect

## 2019-01-17 DIAGNOSIS — J01.00 ACUTE NON-RECURRENT MAXILLARY SINUSITIS: Primary | ICD-10-CM

## 2019-01-17 RX ORDER — AMOXICILLIN 875 MG/1
875 TABLET, COATED ORAL 2 TIMES DAILY
Qty: 14 TABLET | Refills: 0 | Status: SHIPPED | OUTPATIENT
Start: 2019-01-17 | End: 2019-01-31

## 2019-01-29 DIAGNOSIS — E03.9 ACQUIRED HYPOTHYROIDISM: ICD-10-CM

## 2019-01-29 RX ORDER — LEVOTHYROXINE SODIUM 0.15 MG/1
150 TABLET ORAL DAILY
Qty: 90 TABLET | Refills: 3 | Status: SHIPPED | OUTPATIENT
Start: 2019-01-29 | End: 2020-01-27 | Stop reason: DRUGHIGH

## 2019-08-12 DIAGNOSIS — B00.2 HERPES GINGIVOSTOMATITIS: Primary | ICD-10-CM

## 2019-08-12 RX ORDER — VALACYCLOVIR HYDROCHLORIDE 1 G/1
TABLET, FILM COATED ORAL
Qty: 30 TABLET | Refills: 5 | Status: SHIPPED | OUTPATIENT
Start: 2019-08-12 | End: 2021-01-12

## 2019-10-01 ENCOUNTER — OFFICE VISIT (OUTPATIENT)
Dept: FAMILY MEDICINE CLINIC | Facility: CLINIC | Age: 49
End: 2019-10-01
Payer: COMMERCIAL

## 2019-10-01 VITALS
HEIGHT: 65 IN | OXYGEN SATURATION: 97 % | RESPIRATION RATE: 16 BRPM | HEART RATE: 86 BPM | BODY MASS INDEX: 24.49 KG/M2 | SYSTOLIC BLOOD PRESSURE: 110 MMHG | WEIGHT: 147 LBS | DIASTOLIC BLOOD PRESSURE: 88 MMHG | TEMPERATURE: 98.6 F

## 2019-10-01 DIAGNOSIS — Z23 NEED FOR INFLUENZA VACCINATION: ICD-10-CM

## 2019-10-01 DIAGNOSIS — J01.40 ACUTE NON-RECURRENT PANSINUSITIS: ICD-10-CM

## 2019-10-01 DIAGNOSIS — J45.20 MILD INTERMITTENT ASTHMA WITHOUT COMPLICATION: ICD-10-CM

## 2019-10-01 DIAGNOSIS — F41.8 OTHER SPECIFIED ANXIETY DISORDERS: ICD-10-CM

## 2019-10-01 DIAGNOSIS — E04.0 DIFFUSE NONTOXIC GOITER: ICD-10-CM

## 2019-10-01 DIAGNOSIS — E03.9 ACQUIRED HYPOTHYROIDISM: ICD-10-CM

## 2019-10-01 DIAGNOSIS — Z13.6 SCREENING FOR CARDIOVASCULAR CONDITION: ICD-10-CM

## 2019-10-01 DIAGNOSIS — Z12.39 SCREENING FOR BREAST CANCER: ICD-10-CM

## 2019-10-01 DIAGNOSIS — F43.23 SITUATIONAL MIXED ANXIETY AND DEPRESSIVE DISORDER: ICD-10-CM

## 2019-10-01 DIAGNOSIS — J06.9 ACUTE UPPER RESPIRATORY INFECTION: Primary | ICD-10-CM

## 2019-10-01 PROCEDURE — 99213 OFFICE O/P EST LOW 20 MIN: CPT | Performed by: FAMILY MEDICINE

## 2019-10-01 PROCEDURE — 3008F BODY MASS INDEX DOCD: CPT | Performed by: FAMILY MEDICINE

## 2019-10-01 PROCEDURE — 90471 IMMUNIZATION ADMIN: CPT | Performed by: FAMILY MEDICINE

## 2019-10-01 PROCEDURE — 90686 IIV4 VACC NO PRSV 0.5 ML IM: CPT | Performed by: FAMILY MEDICINE

## 2019-10-01 RX ORDER — DOXYCYCLINE 100 MG/1
100 CAPSULE ORAL 2 TIMES DAILY
Qty: 14 CAPSULE | Refills: 0 | Status: SHIPPED | OUTPATIENT
Start: 2019-10-01 | End: 2019-11-05 | Stop reason: SDUPTHER

## 2019-10-01 RX ORDER — DULOXETIN HYDROCHLORIDE 30 MG/1
30 CAPSULE, DELAYED RELEASE ORAL DAILY
Qty: 90 CAPSULE | Refills: 3 | Status: SHIPPED | OUTPATIENT
Start: 2019-10-01 | End: 2021-04-05 | Stop reason: SDUPTHER

## 2019-10-01 NOTE — PATIENT INSTRUCTIONS
Medications as prescribed  Inhaler as needed  Stay current with GYN mammogram     Vaccinations       xyzal 5mg daily   Afrin daily x 3 days Flonase daily x 7

## 2019-10-01 NOTE — PROGRESS NOTES
Chief Complaint   Patient presents with    Sinusitis     [facility]       Subjective:           Problem List Items Addressed This Visit        Endocrine    Hypothyroidism    Relevant Orders    TSH, 3rd generation    Diffuse nontoxic goiter    Relevant Orders    Comprehensive metabolic panel    Lipid panel    TSH, 3rd generation       Respiratory    Mild intermittent asthma without complication    Acute upper respiratory infection - Primary    Relevant Medications    doxycycline monohydrate (MONODOX) 100 mg capsule    Acute non-recurrent pansinusitis       Other    Anxiety disorder    Relevant Medications    DULoxetine (CYMBALTA) 30 mg delayed release capsule      Other Visit Diagnoses     Need for influenza vaccination        Relevant Orders    influenza vaccine, 8268-8030, quadrivalent, 0 5 mL, preservative-free, for adult and pediatric patients 6 mos+ (AFLURIA, FLUARIX, FLULAVAL, FLUZONE)    Screening for cardiovascular condition        Relevant Orders    Comprehensive metabolic panel    Lipid panel    Situational mixed anxiety and depressive disorder        Relevant Medications    DULoxetine (CYMBALTA) 30 mg delayed release capsule              Orders Placed This Encounter   Procedures    influenza vaccine, [id number], quadrivalent, 0 5 mL, preservative-free, for adult and pediatric patients 6 mos+ (AFLURIA, FLUARIX, FLULAVAL, FLUZONE)    Comprehensive metabolic panel     This is a patient instruction: Patient fasting for 8 hours or longer recommended  Standing Status:   Future     Standing Expiration Date:   10/1/2020    Lipid panel     This is a patient instruction: This test requires patient fasting for 10-12 hours or longer  Drinking of black coffee or black tea is acceptable  Standing Status:   Future     Standing Expiration Date:   10/1/2020    TSH, 3rd generation     This is a patient instruction: This test is non-fasting  Please drink two glasses of water morning of bloodwork          Standing Status:   Future     Standing Expiration Date:   10/1/2020       Patient Instructions   Medications as prescribed  Inhaler as needed  Stay current with GYN mammogram     Vaccinations       xyzal 5mg daily  Afrin daily x 3 days Flonase daily x 7     BMI Counseling: Body mass index is 24 46 kg/m²  Discussed the patient's BMI with her  The BMI is above normal  Nutrition recommendations include decreasing overall calorie intake, increasing intake of lean protein and reducing intake of saturated fat and trans fat  [place]    Chief Complaint   Patient presents with    Sinusitis     jma     HPI Jessy Sanz she has a history of extrinsic asthma mild arthritis allergies situational anxiety is in for evaluation URI nasal congestion daughter with similar symptoms  Jessy Sanz tends to have more active upper respiratory symptoms in the fall and winter months as does her daughter and other family members    She has had nasal septal surgery    /88   Pulse 86   Temp 98 6 °F (37 °C)   Resp 16   Ht 5' 5" (1 651 m)   Wt 66 7 kg (147 lb)   LMP 06/13/2013 (Approximate)   SpO2 97%   BMI 24 46 kg/m²       No Known Allergies    Current Outpatient Medications on File Prior to Visit   Medication Sig Dispense Refill    albuterol (PROAIR HFA) 90 mcg/act inhaler Inhale 2 puffs 4 (four) times a day as needed      levocetirizine (XYZAL) 5 MG tablet Take by mouth daily      levothyroxine 150 mcg tablet TAKE 1 TABLET (150 MCG TOTAL) BY MOUTH DAILY FOR 90 DAYS 90 tablet 3    valACYclovir (VALTREX) 1,000 mg tablet TAKE 2 PILLS INITIALLY THEN REPEAT IN 12 HOURS 30 tablet 5    benzonatate (TESSALON) 200 MG capsule Take 1 capsule (200 mg total) by mouth 3 (three) times a day as needed for cough (Patient not taking: Reported on 12/4/2018 ) 20 capsule 0    doxycycline hyclate (VIBRAMYCIN) 100 mg capsule   0    sulindac (CLINORIL) 150 MG tablet Take 1 tablet (150 mg total) by mouth 2 (two) times a day (Patient not taking: Reported on 9/21/2018 ) 30 tablet 1    [DISCONTINUED] DULoxetine (CYMBALTA) 30 mg delayed release capsule Take 1 capsule (30 mg total) by mouth daily for 90 days 90 capsule 3     No current facility-administered medications on file prior to visit  Past Medical History:   Diagnosis Date    Asthma     Extrinsic asthma with acute exacerbation  Onset date: 10/10/2011     Lymphadenopathy     Last assessed 6/18/2008    Mastodynia     Last assessed 6/15/2009     Neoplasm of uncertain behavior of skin     Last assessed 9/13/2011     Open wound of hand     Resolved 9/11/2014        Past Surgical History:   Procedure Laterality Date    HYSTERECTOMY      NASAL SEPTUM SURGERY            reports that she has been smoking  She has never used smokeless tobacco  She reports that she drinks alcohol  She reports that she does not use drugs  reports that she has been smoking  She has never used smokeless tobacco         Review of Systems   Constitutional: Positive for fatigue  Negative for diaphoresis and fever  HENT: Positive for congestion and sinus pressure  Negative for ear pain, facial swelling, hearing loss, sneezing and tinnitus  Eyes: Negative for photophobia and redness  Respiratory: Negative for apnea, cough, choking, shortness of breath and wheezing  Cardiovascular: Negative for chest pain  Gastrointestinal: Negative for abdominal distention  Genitourinary: Negative for hematuria and vaginal bleeding  Musculoskeletal: Negative for arthralgias  Neurological: Negative for dizziness, tremors, facial asymmetry, speech difficulty, numbness and headaches  Psychiatric/Behavioral: Negative for agitation  Situational anxiety       Physical Exam   Constitutional: She is oriented to person, place, and time  She appears well-developed and well-nourished  No distress  HENT:   Head: Normocephalic and atraumatic    r tm retracted  tender ant   No mass no adenopathy   Eyes: Pupils are equal, round, and reactive to light  EOM are normal  No scleral icterus  Neck: Neck supple  No JVD present  No tracheal deviation present  Thyromegaly present  Thyroid prominent R>L no defined nodule   Cardiovascular: Normal rate  No murmur heard  Pulmonary/Chest: Effort normal and breath sounds normal  She has no rales  She exhibits no tenderness  ronchi   Abdominal: Soft  There is no guarding  Musculoskeletal:     ROM full  N/V intact   Lymphadenopathy:     She has no cervical adenopathy  Neurological: She is alert and oriented to person, place, and time  She displays normal reflexes  Skin: Skin is warm and dry  Capillary refill takes less than 2 seconds  Psychiatric: She has a normal mood and affect

## 2019-10-10 DIAGNOSIS — E04.0 DIFFUSE NONTOXIC GOITER: Primary | ICD-10-CM

## 2019-10-10 DIAGNOSIS — E03.9 ACQUIRED HYPOTHYROIDISM: ICD-10-CM

## 2019-10-10 LAB
ALBUMIN SERPL-MCNC: 4.4 G/DL (ref 3.5–5.5)
ALBUMIN/GLOB SERPL: 1.2 {RATIO} (ref 1.2–2.2)
ALP SERPL-CCNC: 72 IU/L (ref 39–117)
ALT SERPL-CCNC: 12 IU/L (ref 0–32)
AST SERPL-CCNC: 18 IU/L (ref 0–40)
BILIRUB SERPL-MCNC: 0.4 MG/DL (ref 0–1.2)
BUN SERPL-MCNC: 17 MG/DL (ref 6–24)
BUN/CREAT SERPL: 14 (ref 9–23)
CALCIUM SERPL-MCNC: 9.8 MG/DL (ref 8.7–10.2)
CHLORIDE SERPL-SCNC: 102 MMOL/L (ref 96–106)
CHOLEST SERPL-MCNC: 234 MG/DL (ref 100–199)
CO2 SERPL-SCNC: 26 MMOL/L (ref 20–29)
CREAT SERPL-MCNC: 1.25 MG/DL (ref 0.57–1)
GLOBULIN SER-MCNC: 3.7 G/DL (ref 1.5–4.5)
GLUCOSE SERPL-MCNC: 92 MG/DL (ref 65–99)
HDLC SERPL-MCNC: 64 MG/DL
LDLC SERPL CALC-MCNC: 154 MG/DL (ref 0–99)
POTASSIUM SERPL-SCNC: 4.5 MMOL/L (ref 3.5–5.2)
PROT SERPL-MCNC: 8.1 G/DL (ref 6–8.5)
SL AMB EGFR AFRICAN AMERICAN: 59 ML/MIN/1.73
SL AMB EGFR NON AFRICAN AMERICAN: 51 ML/MIN/1.73
SL AMB VLDL CHOLESTEROL CALC: 16 MG/DL (ref 5–40)
SODIUM SERPL-SCNC: 140 MMOL/L (ref 134–144)
TRIGL SERPL-MCNC: 78 MG/DL (ref 0–149)
TSH SERPL DL<=0.005 MIU/L-ACNC: 430.5 UIU/ML (ref 0.45–4.5)

## 2019-11-05 DIAGNOSIS — J06.9 ACUTE UPPER RESPIRATORY INFECTION: ICD-10-CM

## 2019-11-05 RX ORDER — DOXYCYCLINE HYCLATE 100 MG/1
100 CAPSULE ORAL EVERY 12 HOURS SCHEDULED
Qty: 14 CAPSULE | Refills: 0 | Status: SHIPPED | OUTPATIENT
Start: 2019-11-05 | End: 2019-11-12

## 2020-01-24 LAB
SL AMB T4, FREE (DIRECT): 1.37 NG/DL (ref 0.82–1.77)
TSH SERPL DL<=0.005 MIU/L-ACNC: 54.58 UIU/ML (ref 0.45–4.5)

## 2020-01-27 DIAGNOSIS — E03.9 ACQUIRED HYPOTHYROIDISM: Primary | ICD-10-CM

## 2020-01-27 RX ORDER — LEVOTHYROXINE SODIUM 175 UG/1
175 TABLET ORAL
Qty: 90 TABLET | Refills: 3 | Status: SHIPPED | OUTPATIENT
Start: 2020-01-27 | End: 2021-06-12 | Stop reason: SDUPTHER

## 2021-01-12 DIAGNOSIS — B00.2 HERPES GINGIVOSTOMATITIS: ICD-10-CM

## 2021-01-12 RX ORDER — VALACYCLOVIR HYDROCHLORIDE 1 G/1
TABLET, FILM COATED ORAL
Qty: 30 TABLET | Refills: 5 | Status: SHIPPED | OUTPATIENT
Start: 2021-01-12 | End: 2021-10-29 | Stop reason: ALTCHOICE

## 2021-04-05 DIAGNOSIS — F43.23 SITUATIONAL MIXED ANXIETY AND DEPRESSIVE DISORDER: ICD-10-CM

## 2021-04-05 RX ORDER — DULOXETIN HYDROCHLORIDE 30 MG/1
30 CAPSULE, DELAYED RELEASE ORAL DAILY
Qty: 90 CAPSULE | Refills: 3 | Status: SHIPPED | OUTPATIENT
Start: 2021-04-05 | End: 2021-07-13 | Stop reason: ALTCHOICE

## 2021-06-12 DIAGNOSIS — E03.9 ACQUIRED HYPOTHYROIDISM: ICD-10-CM

## 2021-06-12 RX ORDER — LEVOTHYROXINE SODIUM 175 UG/1
175 TABLET ORAL
Qty: 90 TABLET | Refills: 3 | Status: SHIPPED | OUTPATIENT
Start: 2021-06-12 | End: 2022-04-22 | Stop reason: SDUPTHER

## 2021-07-13 ENCOUNTER — OFFICE VISIT (OUTPATIENT)
Dept: FAMILY MEDICINE CLINIC | Facility: CLINIC | Age: 51
End: 2021-07-13
Payer: COMMERCIAL

## 2021-07-13 VITALS
TEMPERATURE: 98.6 F | SYSTOLIC BLOOD PRESSURE: 120 MMHG | HEART RATE: 82 BPM | HEIGHT: 65 IN | BODY MASS INDEX: 23.66 KG/M2 | WEIGHT: 142 LBS | OXYGEN SATURATION: 98 % | DIASTOLIC BLOOD PRESSURE: 72 MMHG | RESPIRATION RATE: 18 BRPM

## 2021-07-13 DIAGNOSIS — J01.40 ACUTE NON-RECURRENT PANSINUSITIS: ICD-10-CM

## 2021-07-13 DIAGNOSIS — F32.9 REACTIVE DEPRESSION: Primary | ICD-10-CM

## 2021-07-13 DIAGNOSIS — E03.9 ACQUIRED HYPOTHYROIDISM: ICD-10-CM

## 2021-07-13 DIAGNOSIS — J45.20 MILD INTERMITTENT ASTHMA WITHOUT COMPLICATION: ICD-10-CM

## 2021-07-13 DIAGNOSIS — E04.0 DIFFUSE NONTOXIC GOITER: ICD-10-CM

## 2021-07-13 PROCEDURE — 99214 OFFICE O/P EST MOD 30 MIN: CPT | Performed by: FAMILY MEDICINE

## 2021-07-13 PROCEDURE — 3008F BODY MASS INDEX DOCD: CPT | Performed by: FAMILY MEDICINE

## 2021-07-13 NOTE — PATIENT INSTRUCTIONS
Consider counseling as discussed  Stay current with routine health maintenance screening gyn mammogram, vaccination update    Endocrinology follow-up for management thyroid Labs as ordered

## 2021-07-13 NOTE — PROGRESS NOTES
Subjective:           Problem List Items Addressed This Visit        Endocrine    Hypothyroidism needs follow up med compliance    Relevant Orders    COMPREHENSIVE METABOLIC NGXIO(93)    TSH, 3rd generation    Lipid panel    Diffuse nontoxic goiter stable labs f/u       Respiratory    Mild intermittent asthma without complication stable    Acute non-recurrent pansinusitis stable    Relevant Orders    COMPREHENSIVE METABOLIC KKLIV(24)    TSH, 3rd generation    Lipid panel       Other    Depression - Primary Highly suggest counselling follow up    Relevant Medications    sertraline (ZOLOFT) 50 mg tablet              Orders Placed This Encounter   Procedures    COMPREHENSIVE METABOLIC DIDBS(04)     This is a patient instruction: Patient fasting for 8 hours or longer recommended  Standing Status:   Future     Standing Expiration Date:   7/13/2022    TSH, 3rd generation     This is a patient instruction: This test is non-fasting  Please drink two glasses of water morning of bloodwork  Standing Status:   Future     Standing Expiration Date:   7/13/2022    Lipid panel     This is a patient instruction: This test requires patient fasting for 10-12 hours or longer  Drinking of black coffee or black tea is acceptable  Standing Status:   Future     Standing Expiration Date:   7/13/2022       Patient Instructions   Consider counseling as discussed  Stay current with routine health maintenance screening gyn mammogram, vaccination update  Endocrinology follow-up for management thyroid    BMI Counseling: Body mass index is 23 63 kg/m²  Discussed the patient's BMI with her  [facility]    Chief Complaint   Patient presents with    Follow-up     medication     CARMELA [facility] is in for follow-up review medications situational anxiety depression stressed  Remote divorce    Loss of father managing elderly mother    /72   Pulse 82   Temp 98 6 °F (37 °C)   Resp 18   Ht 5' 5" (1 651 m)   Wt 64 4 kg (142 lb)   LMP 06/13/2013 (Approximate)   SpO2 98%   BMI 23 63 kg/m²       No Known Allergies    Current Outpatient Medications on File Prior to Visit   Medication Sig Dispense Refill    albuterol (PROAIR HFA) 90 mcg/act inhaler Inhale 2 puffs 4 (four) times a day as needed      benzonatate (TESSALON) 200 MG capsule Take 1 capsule (200 mg total) by mouth 3 (three) times a day as needed for cough (Patient not taking: Reported on 12/4/2018 ) 20 capsule 0    doxycycline hyclate (VIBRAMYCIN) 100 mg capsule   0    levocetirizine (XYZAL) 5 MG tablet Take by mouth daily      levothyroxine 175 mcg tablet TAKE 1 TABLET (175 MCG TOTAL) BY MOUTH DAILY IN THE EARLY MORNING 90 tablet 3    sulindac (CLINORIL) 150 MG tablet Take 1 tablet (150 mg total) by mouth 2 (two) times a day (Patient not taking: Reported on 9/21/2018 ) 30 tablet 1    valACYclovir (VALTREX) 1,000 mg tablet TAKE 2 PILLS INITIALLY THEN REPEAT IN 12 HOURS 30 tablet 5    [DISCONTINUED] DULoxetine (CYMBALTA) 30 mg delayed release capsule Take 1 capsule (30 mg total) by mouth daily 90 capsule 3    [DISCONTINUED] levothyroxine 175 mcg tablet Take 1 tablet (175 mcg total) by mouth daily in the early morning 90 tablet 3     No current facility-administered medications on file prior to visit  Past Medical History:   Diagnosis Date    Asthma     Extrinsic asthma with acute exacerbation  Onset date: 10/10/2011     Lymphadenopathy     Last assessed 6/18/2008    Mastodynia     Last assessed 6/15/2009     Neoplasm of uncertain behavior of skin     Last assessed 9/13/2011     Open wound of hand     Resolved 9/11/2014        Past Surgical History:   Procedure Laterality Date    HYSTERECTOMY      NASAL SEPTUM SURGERY            reports that she has been smoking  She has never used smokeless tobacco  She reports current alcohol use  She reports that she does not use drugs  reports that she has been smoking   She has never used smokeless tobacco         Review of Systems   Constitutional: Positive for fatigue  Negative for diaphoresis and fever  HENT: Negative for congestion, ear pain, facial swelling, hearing loss, sinus pressure, sneezing and tinnitus  Eyes: Negative for photophobia and redness  Respiratory: Negative for apnea, cough, choking, shortness of breath and wheezing  Cardiovascular: Negative for chest pain  Gastrointestinal: Negative for abdominal distention, blood in stool and diarrhea  Genitourinary: Negative for hematuria and vaginal bleeding  Musculoskeletal: Negative for arthralgias and joint swelling  Neurological: Negative for dizziness, tremors, facial asymmetry, speech difficulty, numbness and headaches  Psychiatric/Behavioral: Positive for dysphoric mood and sleep disturbance  Negative for agitation, confusion and self-injury  The patient is nervous/anxious  Situational anxiety       Physical Exam  Constitutional:       General: She is not in acute distress  Appearance: She is well-developed  HENT:      Head: Normocephalic and atraumatic  Eyes:      General: No scleral icterus  Pupils: Pupils are equal, round, and reactive to light  Neck:      Thyroid: Thyromegaly present  Vascular: No JVD  Trachea: No tracheal deviation  Comments: Thyroid prominent R>L no defined nodule  Cardiovascular:      Rate and Rhythm: Normal rate  Heart sounds: No murmur heard  Pulmonary:      Effort: Pulmonary effort is normal       Breath sounds: Normal breath sounds  No rales  Chest:      Chest wall: No tenderness  Abdominal:      General: Bowel sounds are normal       Palpations: Abdomen is soft  Tenderness: There is no guarding  Musculoskeletal:      Cervical back: Neck supple  Comments:   ROM full  N/V intact   Lymphadenopathy:      Cervical: No cervical adenopathy  Skin:     General: Skin is warm and dry        Capillary Refill: Capillary refill takes less than 2 seconds  Neurological:      Mental Status: She is alert and oriented to person, place, and time  Cranial Nerves: No cranial nerve deficit  Motor: No weakness  Gait: Gait normal       Deep Tendon Reflexes: Reflexes normal    Psychiatric:         Thought Content:  Thought content normal       Comments: Talkative anxious

## 2021-10-07 ENCOUNTER — IMMUNIZATIONS (OUTPATIENT)
Dept: FAMILY MEDICINE CLINIC | Facility: CLINIC | Age: 51
End: 2021-10-07
Payer: COMMERCIAL

## 2021-10-07 DIAGNOSIS — Z23 ENCOUNTER FOR IMMUNIZATION: Primary | ICD-10-CM

## 2021-10-07 PROCEDURE — 90682 RIV4 VACC RECOMBINANT DNA IM: CPT

## 2021-10-07 PROCEDURE — 90471 IMMUNIZATION ADMIN: CPT

## 2021-10-29 ENCOUNTER — OFFICE VISIT (OUTPATIENT)
Dept: FAMILY MEDICINE CLINIC | Facility: CLINIC | Age: 51
End: 2021-10-29
Payer: COMMERCIAL

## 2021-10-29 VITALS
HEART RATE: 91 BPM | TEMPERATURE: 97.4 F | RESPIRATION RATE: 16 BRPM | SYSTOLIC BLOOD PRESSURE: 138 MMHG | HEIGHT: 65 IN | WEIGHT: 148 LBS | BODY MASS INDEX: 24.66 KG/M2 | OXYGEN SATURATION: 98 % | DIASTOLIC BLOOD PRESSURE: 90 MMHG

## 2021-10-29 DIAGNOSIS — Z91.89 RISK OF EXPOSURE TO LYME DISEASE: Primary | ICD-10-CM

## 2021-10-29 PROCEDURE — 99212 OFFICE O/P EST SF 10 MIN: CPT | Performed by: FAMILY MEDICINE

## 2021-10-29 RX ORDER — DOXYCYCLINE HYCLATE 100 MG/1
200 CAPSULE ORAL ONCE
Qty: 2 CAPSULE | Refills: 0 | Status: SHIPPED | OUTPATIENT
Start: 2021-10-29 | End: 2021-10-29

## 2021-12-30 ENCOUNTER — OFFICE VISIT (OUTPATIENT)
Dept: FAMILY MEDICINE CLINIC | Facility: CLINIC | Age: 51
End: 2021-12-30
Payer: COMMERCIAL

## 2021-12-30 VITALS
HEART RATE: 71 BPM | BODY MASS INDEX: 25.96 KG/M2 | DIASTOLIC BLOOD PRESSURE: 86 MMHG | RESPIRATION RATE: 18 BRPM | SYSTOLIC BLOOD PRESSURE: 116 MMHG | OXYGEN SATURATION: 99 % | WEIGHT: 156 LBS | TEMPERATURE: 97.4 F

## 2021-12-30 DIAGNOSIS — E03.9 ACQUIRED HYPOTHYROIDISM: Primary | ICD-10-CM

## 2021-12-30 DIAGNOSIS — Z11.59 NEED FOR HEPATITIS C SCREENING TEST: ICD-10-CM

## 2021-12-30 DIAGNOSIS — Z11.4 SCREENING FOR HIV (HUMAN IMMUNODEFICIENCY VIRUS): ICD-10-CM

## 2021-12-30 DIAGNOSIS — E03.9 ADULT MYXEDEMA: ICD-10-CM
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
## 2021-12-30 PROCEDURE — 99213 OFFICE O/P EST LOW 20 MIN: CPT | Performed by: FAMILY MEDICINE

## 2021-12-30 RX ORDER — AMOXICILLIN AND CLAVULANATE POTASSIUM 875; 125 MG/1; MG/1
1 TABLET, FILM COATED ORAL EVERY 12 HOURS SCHEDULED
Qty: 20 TABLET | Refills: 0 | Status: SHIPPED | OUTPATIENT
Start: 2021-12-30 | End: 2022-01-09

## 2022-01-05 ENCOUNTER — OFFICE VISIT (OUTPATIENT)
Dept: URGENT CARE | Facility: CLINIC | Age: 52
End: 2022-01-05
Payer: COMMERCIAL

## 2022-01-05 VITALS
TEMPERATURE: 97.5 F | HEART RATE: 72 BPM | SYSTOLIC BLOOD PRESSURE: 138 MMHG | OXYGEN SATURATION: 100 % | RESPIRATION RATE: 16 BRPM | DIASTOLIC BLOOD PRESSURE: 62 MMHG

## 2022-01-05 DIAGNOSIS — Z01.30 BLOOD PRESSURE CHECK: Primary | ICD-10-CM

## 2022-01-05 PROCEDURE — 99203 OFFICE O/P NEW LOW 30 MIN: CPT | Performed by: PHYSICIAN ASSISTANT

## 2022-01-06 NOTE — PATIENT INSTRUCTIONS
Vitals all within normal limits, benign physical exam   Recommend taking blood pressure 1st thing in the morning over the next couple weeks and tracking results  Encourage follow-up with PCP for further evaluation and management  To return or be seen in ER with any progression or worsening of symptoms  Patient understands and is agreeable with this plan

## 2022-01-06 NOTE — PROGRESS NOTES
[facility]        NAME: Martín Cruz is a 54 y o  female  : 1970    MRN: [id number]  DATE: 2022  TIME: 7:38 PM    Assessment and Plan   Blood pressure check [Z01 30]  1  Blood pressure check           Patient Instructions     Patient Instructions   Vitals all within normal limits, benign physical exam   Recommend taking blood pressure 1st thing in the morning over the next couple weeks and tracking results  Encourage follow-up with PCP for further evaluation and management  To return or be seen in ER with any progression or worsening of symptoms  Patient understands and is agreeable with this plan  Follow up with PCP in 3-5 days  Proceed to  ER if symptoms worsen  Chief Complaint     Chief Complaint   Patient presents with    Hypertension     pt presnets with elevated BP; History of Present Illness       Patient is a 53-year-old female presenting today with elevated blood pressure x1 day  Patient notes while at home this afternoon she was feeling stressed, states she took her blood pressure within at home electric blood pressure cuff and notes that the top number was 180, expressed concern as it has never been that high  Denies history of hypertension  Notes that she has been under a lot of stress due to the loss of her father recently  Denies lightheadedness, dizziness, headache, vision changes, chest pain, palpitations  Review of Systems   Review of Systems   Constitutional: Negative for chills and fever  HENT: Negative for ear pain and sore throat  Eyes: Negative for pain and visual disturbance  Respiratory: Negative for cough and shortness of breath  Cardiovascular: Negative for chest pain and palpitations  Gastrointestinal: Negative for abdominal pain and vomiting  Genitourinary: Negative for dysuria and hematuria  Musculoskeletal: Negative for arthralgias and back pain  Skin: Negative for color change and rash     Neurological: Negative for seizures and syncope  All other systems reviewed and are negative  Current Medications       Current Outpatient Medications:     albuterol (PROAIR HFA) 90 mcg/act inhaler, Inhale 2 puffs 4 (four) times a day as needed, Disp: , Rfl:     amoxicillin-clavulanate (AUGMENTIN) 875-125 mg per tablet, Take 1 tablet by mouth every 12 (twelve) hours for 10 days, Disp: 20 tablet, Rfl: 0    levocetirizine (XYZAL) 5 MG tablet, Take by mouth daily, Disp: , Rfl:     levothyroxine 175 mcg tablet, TAKE 1 TABLET (175 MCG TOTAL) BY MOUTH DAILY IN THE EARLY MORNING, Disp: 90 tablet, Rfl: 3    sertraline (ZOLOFT) 50 mg tablet, Take 1 tablet (50 mg total) by mouth daily, Disp: 30 tablet, Rfl: 5    Current Allergies     Allergies as of 01/05/2022    (No Known Allergies)            The following portions of the patient's history were reviewed and updated as appropriate: allergies, current medications, past family history, past medical history, past social history, past surgical history and problem list      Past Medical History:   Diagnosis Date    Asthma     Extrinsic asthma with acute exacerbation  Onset date: 10/10/2011     Lymphadenopathy     Last assessed 6/18/2008    Mastodynia     Last assessed 6/15/2009     Neoplasm of uncertain behavior of skin     Last assessed 9/13/2011     Open wound of hand     Resolved 9/11/2014        Past Surgical History:   Procedure Laterality Date    HYSTERECTOMY      NASAL SEPTUM SURGERY         Family History   Problem Relation Age of Onset    Rheum arthritis Mother     Hypothyroidism Mother          Medications have been verified  Objective   /62   Pulse 72   Temp 97 5 °F (36 4 °C)   Resp 16   LMP 06/13/2013 (Approximate)   SpO2 100%        Physical Exam     Physical Exam  Vitals reviewed  Constitutional:       General: She is not in acute distress  Appearance: Normal appearance  She is not ill-appearing     HENT:      Head: Normocephalic and atraumatic  Right Ear: Tympanic membrane, ear canal and external ear normal       Left Ear: Tympanic membrane, ear canal and external ear normal       Nose: Nose normal       Mouth/Throat:      Mouth: Mucous membranes are moist       Pharynx: Oropharynx is clear  Eyes:      Extraocular Movements: Extraocular movements intact  Conjunctiva/sclera: Conjunctivae normal       Pupils: Pupils are equal, round, and reactive to light  Cardiovascular:      Rate and Rhythm: Normal rate and regular rhythm  Pulses: Normal pulses  Heart sounds: Normal heart sounds  Pulmonary:      Effort: Pulmonary effort is normal       Breath sounds: Normal breath sounds  Skin:     General: Skin is warm  Capillary Refill: Capillary refill takes less than 2 seconds  Neurological:      General: No focal deficit present  Mental Status: She is alert and oriented to person, place, and time

## 2022-04-20 LAB
HCV AB S/CO SERPL IA: <0.1 S/CO RATIO (ref 0–0.9)
HIV 1+2 AB+HIV1 P24 AG SERPL QL IA: NON REACTIVE
T4 FREE SERPL-MCNC: 1.38 NG/DL (ref 0.82–1.77)
TSH SERPL DL<=0.005 MIU/L-ACNC: 133 UIU/ML (ref 0.45–4.5)

## 2022-04-22 ENCOUNTER — TELEPHONE (OUTPATIENT)
Dept: FAMILY MEDICINE CLINIC | Facility: CLINIC | Age: 52
End: 2022-04-22

## 2022-04-22 DIAGNOSIS — F32.9 REACTIVE DEPRESSION: ICD-10-CM

## 2022-04-22 DIAGNOSIS — E03.9 ACQUIRED HYPOTHYROIDISM: ICD-10-CM

## 2022-04-22 RX ORDER — LEVOTHYROXINE SODIUM 175 UG/1
175 TABLET ORAL
Qty: 90 TABLET | Refills: 3 | Status: SHIPPED | OUTPATIENT
Start: 2022-04-22 | End: 2022-05-10

## 2022-05-03 ENCOUNTER — RA CDI HCC (OUTPATIENT)
Dept: OTHER | Facility: HOSPITAL | Age: 52
End: 2022-05-03

## 2022-05-03 NOTE — PROGRESS NOTES
[facility]  coding opportunities       Chart reviewed, no opportunity found: CHART REVIEWED, NO OPPORTUNITY FOUND        Patients Insurance        Commercial Insurance: [employer]

## 2022-05-10 ENCOUNTER — OFFICE VISIT (OUTPATIENT)
Dept: FAMILY MEDICINE CLINIC | Facility: CLINIC | Age: 52
End: 2022-05-10
Payer: COMMERCIAL

## 2022-05-10 VITALS
OXYGEN SATURATION: 96 % | RESPIRATION RATE: 16 BRPM | HEIGHT: 65 IN | BODY MASS INDEX: 23.93 KG/M2 | WEIGHT: 143.6 LBS | DIASTOLIC BLOOD PRESSURE: 90 MMHG | TEMPERATURE: 97.4 F | HEART RATE: 109 BPM | SYSTOLIC BLOOD PRESSURE: 120 MMHG

## 2022-05-10 DIAGNOSIS — E03.9 ACQUIRED HYPOTHYROIDISM: Primary | ICD-10-CM

## 2022-05-10 DIAGNOSIS — R06.83 SNORING: ICD-10-CM

## 2022-05-10 DIAGNOSIS — R07.81 RIB PAIN: ICD-10-CM

## 2022-05-10 DIAGNOSIS — Z12.31 ENCOUNTER FOR SCREENING MAMMOGRAM FOR MALIGNANT NEOPLASM OF BREAST: ICD-10-CM

## 2022-05-10 PROCEDURE — 99213 OFFICE O/P EST LOW 20 MIN: CPT | Performed by: FAMILY MEDICINE

## 2022-05-10 RX ORDER — LEVOTHYROXINE SODIUM 0.05 MG/1
50 TABLET ORAL
Qty: 60 TABLET | Refills: 1 | Status: SHIPPED | OUTPATIENT
Start: 2022-05-10

## 2022-05-10 RX ORDER — LEVOTHYROXINE SODIUM 0.2 MG/1
200 TABLET ORAL
Qty: 60 TABLET | Refills: 1 | Status: SHIPPED | OUTPATIENT
Start: 2022-05-10

## 2022-05-10 NOTE — PROGRESS NOTES
Michael Taylor 1970 female MRN: [id number]    Family Medicine Acute Visit    ASSESSMENT/PLAN  1  Acquired hypothyroidism  · Recent TSH elevated at 133 despite taking levothyroxine 175 mcg, will increase to levothyroxine 250 mcg daily and repeat TSH within 6 weeks  - levothyroxine 200 mcg tablet; Take 1 tablet (200 mcg total) by mouth daily in the early morning  Dispense: 60 tablet; Refill: 1  - levothyroxine (Euthyrox) 50 mcg tablet; Take 1 tablet (50 mcg total) by mouth daily in the early morning  Dispense: 60 tablet; Refill: 1  - TSH, 3rd generation; Future  - Lipid Panel with Direct LDL reflex; Future  - Basic metabolic panel; Future    2  Rib pain  · Chest pain is likely musculoskeletal in origin given that it is reproducible on exam   · Can take NSAIDs or Tylenol as needed for the pain  · RTO if pain does not improve or worsens  3  Snoring  · Will send for home sleep study at patient request   - Home Study; Future    4  Encounter for screening mammogram for malignant neoplasm of breast  · Per care gaps  · RTO for annual women's well exam  - Mammo screening bilateral w 3d & cad; Future         Future Appointments   Date Time Provider Arcadio Mosqueda   5/26/2022  3:00 PM Jose Martin Cisneros MD [facility]   6/8/2022  6:20 PM Taffy Lefort, MD [facility]          SUBJECTIVE  CC: Results (Has blood work done for thyroid )      HPI:  Michael Taylor is a 54 y o  female who presents for    Thyroid Problem  Presents for follow-up visit  Symptoms include cold intolerance, constipation, dry skin, fatigue and hair loss  Patient reports no anxiety, diaphoresis, diarrhea, heat intolerance, leg swelling, palpitations or weight gain  The symptoms have been improving (started taking levothyroxine two months ago)  Chest Pain   This is a new problem  Episode onset: couple of months ago  The onset quality is sudden   The problem occurs intermittently (only lasts a few seconds to a few minutes)  Pain location: left sided  The quality of the pain is described as dull  The pain does not radiate  Pertinent negatives include no cough, diaphoresis, dizziness, exertional chest pressure, irregular heartbeat, lower extremity edema, orthopnea, palpitations or shortness of breath  The pain is aggravated by nothing  She has tried nothing for the symptoms  Her past medical history is significant for thyroid problem  Snoring  She reports history of snoring without witnessed episodes of apnea  Her daughter and son also snore  Would like to get home sleep study  Review of Systems   Constitutional: Positive for fatigue  Negative for diaphoresis and weight gain  Respiratory: Negative for cough and shortness of breath  Cardiovascular: Positive for chest pain  Negative for palpitations and orthopnea  Gastrointestinal: Positive for constipation  Negative for diarrhea  Endocrine: Positive for cold intolerance  Negative for heat intolerance  Neurological: Negative for dizziness  Psychiatric/Behavioral: The patient is not nervous/anxious  Historical Information   The patient history was reviewed as follows:  Past Medical History:   Diagnosis Date    Asthma     Extrinsic asthma with acute exacerbation   Onset date: 10/10/2011     Lymphadenopathy     Last assessed 6/18/2008    Mastodynia     Last assessed 6/15/2009     Neoplasm of uncertain behavior of skin     Last assessed 9/13/2011     Open wound of hand     Resolved 9/11/2014          Past Surgical History:   Procedure Laterality Date    HYSTERECTOMY      NASAL SEPTUM SURGERY       Family History   Problem Relation Age of Onset    Rheum arthritis Mother     Hypothyroidism Mother       Social History   Social History     Substance and Sexual Activity   Alcohol Use Yes    Comment: social     Social History     Substance and Sexual Activity   Drug Use No     Social History     Tobacco Use   Smoking Status Current Every Day Smoker Smokeless Tobacco Never Used       Medications:     Current Outpatient Medications:     albuterol (PROAIR HFA) 90 mcg/act inhaler, Inhale 2 puffs 4 (four) times a day as needed, Disp: , Rfl:     levocetirizine (XYZAL) 5 MG tablet, Take by mouth daily, Disp: , Rfl:     levothyroxine 200 mcg tablet, Take 1 tablet (200 mcg total) by mouth daily in the early morning, Disp: 60 tablet, Rfl: 1    sertraline (ZOLOFT) 50 mg tablet, Take 1 tablet (50 mg total) by mouth daily, Disp: 30 tablet, Rfl: 5    levothyroxine (Euthyrox) 50 mcg tablet, Take 1 tablet (50 mcg total) by mouth daily in the early morning, Disp: 60 tablet, Rfl: 1    No Known Allergies    OBJECTIVE  Vitals:   Vitals:    05/10/22 1451   BP: 120/90   BP Location: Left arm   Patient Position: Standing   Cuff Size: Standard   Pulse: (!) 109   Resp: 16   Temp: (!) 97 4 °F (36 3 °C)   TempSrc: Tympanic   SpO2: 96%   Weight: 65 1 kg (143 lb 9 6 oz)   Height: 5' 5" (1 651 m)         Physical Exam  Vitals reviewed  Constitutional:       General: She is awake  She is not in acute distress  Cardiovascular:      Rate and Rhythm: Normal rate and regular rhythm  Heart sounds: Normal heart sounds, S1 normal and S2 normal    Pulmonary:      Effort: Pulmonary effort is normal       Breath sounds: Normal breath sounds and air entry  No decreased breath sounds, wheezing, rhonchi or rales  Musculoskeletal:        Arms:       Right lower leg: No edema  Left lower leg: No edema  Neurological:      Mental Status: She is alert  Psychiatric:         Behavior: Behavior is cooperative              Red Barrera[address]   5/10/2022

## 2022-05-16 ENCOUNTER — OFFICE VISIT (OUTPATIENT)
Dept: URGENT CARE | Facility: CLINIC | Age: 52
End: 2022-05-16
Payer: COMMERCIAL

## 2022-05-16 VITALS
HEIGHT: 65 IN | RESPIRATION RATE: 18 BRPM | SYSTOLIC BLOOD PRESSURE: 129 MMHG | DIASTOLIC BLOOD PRESSURE: 86 MMHG | HEART RATE: 53 BPM | BODY MASS INDEX: 25.49 KG/M2 | WEIGHT: 153 LBS | OXYGEN SATURATION: 100 % | TEMPERATURE: 97.9 F

## 2022-05-16 DIAGNOSIS — S80.212A ABRASION OF LEFT KNEE, INITIAL ENCOUNTER: Primary | ICD-10-CM

## 2022-05-16 PROCEDURE — 99214 OFFICE O/P EST MOD 30 MIN: CPT | Performed by: PHYSICIAN ASSISTANT

## 2022-05-16 NOTE — PROGRESS NOTES
[facility]        NAME: Janay Shepard is a 54 y o  female  : 1970    MRN: [id number]  DATE: May 16, 2022  TIME: 2:25 PM    Assessment and Plan   Abrasion of left knee, initial encounter [S80 212A]  1  Abrasion of left knee, initial encounter  mupirocin (BACTROBAN) 2 % ointment     Use mupirocin 3x/day  No more peroxide  Clean with water and gentle/mild soap  Keep covered 1 wk  Rest, ice, elevation  Discussed signs of infection to watch out for  Discussed strict return to care precautions as well as red flag symptoms which should prompt immediate ED referral  Pt verbalized understanding and is in agreement with plan  Please follow up with your primary care provider within the next week  Please remember that your visit today was with an urgent care provider and should not replace follow up with your primary care provider for chronic medical issues or annual physicals  Patient Instructions       Follow up with PCP in 3-5 days  Proceed to  ER if symptoms worsen  Chief Complaint     Chief Complaint   Patient presents with    Knee Injury     Fall  walking was dragged by dog on asphalt has abrasions on lt knee is tight and painful and red  TDAP about 8 yrs  History of Present Illness       Pt is a 53 yo female with pmh asthma who pw L knee abrasion x 2 days  Dog pulled her forward and she skinned her knee through her pajama pants  No head strike or LOC  Cleaned immediately with peroxide and has been cleaning with peroxide a few times per day since then  Has been keeping it covered  Feels it is getting redder  No bleeding or drainage  Review of Systems   Review of Systems   Constitutional: Negative for chills, diaphoresis and fever  HENT: Negative for congestion, rhinorrhea and sore throat  Eyes: Negative for discharge and itching  Respiratory: Negative for cough, chest tightness, shortness of breath and wheezing  Cardiovascular: Negative for chest pain  Gastrointestinal: Negative for diarrhea, nausea and vomiting  Musculoskeletal: Negative for myalgias  Skin: Positive for wound  Negative for rash  Neurological: Negative for weakness and numbness  Current Medications       Current Outpatient Medications:     levocetirizine (XYZAL) 5 MG tablet, Take by mouth daily, Disp: , Rfl:     levothyroxine (Euthyrox) 50 mcg tablet, Take 1 tablet (50 mcg total) by mouth daily in the early morning, Disp: 60 tablet, Rfl: 1    levothyroxine 200 mcg tablet, Take 1 tablet (200 mcg total) by mouth daily in the early morning, Disp: 60 tablet, Rfl: 1    mupirocin (BACTROBAN) 2 % ointment, Apply topically 3 (three) times a day, Disp: 22 g, Rfl: 0    sertraline (ZOLOFT) 50 mg tablet, Take 1 tablet (50 mg total) by mouth daily, Disp: 30 tablet, Rfl: 5    albuterol (PROVENTIL HFA,VENTOLIN HFA) 90 mcg/act inhaler, Inhale 2 puffs 4 (four) times a day as needed (Patient not taking: Reported on 5/16/2022), Disp: , Rfl:     Current Allergies     Allergies as of 05/16/2022    (No Known Allergies)            The following portions of the patient's history were reviewed and updated as appropriate: allergies, current medications, past family history, past medical history, past social history, past surgical history and problem list      Past Medical History:   Diagnosis Date    Asthma     Extrinsic asthma with acute exacerbation  Onset date: 10/10/2011     Lymphadenopathy     Last assessed 6/18/2008    Mastodynia     Last assessed 6/15/2009     Neoplasm of uncertain behavior of skin     Last assessed 9/13/2011     Open wound of hand     Resolved 9/11/2014        Past Surgical History:   Procedure Laterality Date    HYSTERECTOMY      NASAL SEPTUM SURGERY         Family History   Problem Relation Age of Onset    Rheum arthritis Mother     Hypothyroidism Mother          Medications have been verified          Objective   /86   Pulse (!) 53   Temp 97 9 °F (36 6 °C) Resp 18   Ht 5' 5" (1 651 m)   Wt 69 4 kg (153 lb)   LMP 06/13/2013 (Approximate)   SpO2 100%   BMI 25 46 kg/m²        Physical Exam     Physical Exam  Vitals and nursing note reviewed  Constitutional:       General: She is not in acute distress  Appearance: Normal appearance  She is not ill-appearing  HENT:      Head: Normocephalic and atraumatic  Cardiovascular:      Rate and Rhythm: Normal rate  Pulmonary:      Effort: Pulmonary effort is normal  No respiratory distress  Musculoskeletal:         General: Signs of injury present  No swelling or tenderness  Normal range of motion  Skin:     General: Skin is warm and dry  Capillary Refill: Capillary refill takes less than 2 seconds  Findings: Abrasion (abrasion noted to lateral aspect of L knee; granulation tissue forming) present  Neurological:      Mental Status: She is alert and oriented to person, place, and time     Psychiatric:         Behavior: Behavior normal

## 2022-05-17 ENCOUNTER — OFFICE VISIT (OUTPATIENT)
Dept: URGENT CARE | Facility: CLINIC | Age: 52
End: 2022-05-17
Payer: COMMERCIAL

## 2022-05-17 VITALS
WEIGHT: 153 LBS | RESPIRATION RATE: 18 BRPM | HEART RATE: 88 BPM | TEMPERATURE: 97.9 F | BODY MASS INDEX: 25.49 KG/M2 | HEIGHT: 65 IN

## 2022-05-17 DIAGNOSIS — S80.212A ABRASION OF LEFT KNEE, INITIAL ENCOUNTER: Primary | ICD-10-CM

## 2022-05-17 DIAGNOSIS — L03.116 CELLULITIS OF LEFT LOWER EXTREMITY: ICD-10-CM

## 2022-05-17 PROCEDURE — 99213 OFFICE O/P EST LOW 20 MIN: CPT | Performed by: PHYSICIAN ASSISTANT

## 2022-05-17 RX ORDER — CEPHALEXIN 500 MG/1
500 CAPSULE ORAL EVERY 8 HOURS SCHEDULED
Qty: 15 CAPSULE | Refills: 0 | Status: SHIPPED | OUTPATIENT
Start: 2022-05-17 | End: 2022-05-22

## 2022-05-17 NOTE — PATIENT INSTRUCTIONS
Take antibiotic as prescribed  Can take over-the-counter ibuprofen or Tylenol as needed for discomfort  Also recommend icing the area as well as elevation  Follow-up with PCP  Return or be seen in ER with any progressing or worsening symptoms

## 2022-05-17 NOTE — PROGRESS NOTES
[facility]        NAME: Francisco Childs is a 54 y o  female  : 1970    MRN: [id number]  DATE: May 17, 2022  TIME: 7:09 PM    Assessment and Plan   Abrasion of left knee, initial encounter [S80 212A]  1  Abrasion of left knee, initial encounter  cephalexin (KEFLEX) 500 mg capsule   2  Cellulitis of left lower extremity  cephalexin (KEFLEX) 500 mg capsule         Patient Instructions     Patient Instructions   Take antibiotic as prescribed  Can take over-the-counter ibuprofen or Tylenol as needed for discomfort  Also recommend icing the area as well as elevation  Follow-up with PCP  Return or be seen in ER with any progressing or worsening symptoms  Follow up with PCP in 3-5 days  Proceed to  ER if symptoms worsen  Chief Complaint     Chief Complaint   Patient presents with    knee infection     Left knee infection worsening since yesterday  Has increased redness, swelling and pain         History of Present Illness       Patient is a 53-year-old female presenting today with left knee abrasion times 4 days  Patient was seen and evaluated here on 2022 for the same abrasion, was prescribed topical antibiotic ointment to prevent for any infection, had the area marked with skin marker to monitor for any progression or worsening of redness, notes that area of redness has progressed outside of marked area and is concerned of worsening infection  Notes she is also experiencing some sensation of numbness down the left side of her leg which seems to be worse after long periods of standing or ambulating  Denies fever, chills, wound discharge or drainage, leg swelling, chest pain, palpitations  Review of Systems   Review of Systems   Constitutional: Negative for chills and fever  HENT: Negative for ear pain and sore throat  Eyes: Negative for pain and visual disturbance  Respiratory: Negative for cough and shortness of breath      Cardiovascular: Negative for chest pain and palpitations  Gastrointestinal: Negative for abdominal pain and vomiting  Genitourinary: Negative for dysuria and hematuria  Musculoskeletal: Negative for arthralgias and back pain  Skin: Positive for wound  She HPI   Neurological: Negative for seizures and syncope  All other systems reviewed and are negative  Current Medications       Current Outpatient Medications:     albuterol (PROVENTIL HFA,VENTOLIN HFA) 90 mcg/act inhaler, Inhale 2 puffs  as needed in the morning and 2 puffs as needed at noon and 2 puffs as needed in the evening and 2 puffs as needed before bedtime  , Disp: , Rfl:     cephalexin (KEFLEX) 500 mg capsule, Take 1 capsule (500 mg total) by mouth every 8 (eight) hours for 5 days, Disp: 15 capsule, Rfl: 0    levocetirizine (XYZAL) 5 MG tablet, Take by mouth daily, Disp: , Rfl:     levothyroxine (Euthyrox) 50 mcg tablet, Take 1 tablet (50 mcg total) by mouth daily in the early morning, Disp: 60 tablet, Rfl: 1    levothyroxine 200 mcg tablet, Take 1 tablet (200 mcg total) by mouth daily in the early morning, Disp: 60 tablet, Rfl: 1    mupirocin (BACTROBAN) 2 % ointment, Apply topically 3 (three) times a day, Disp: 22 g, Rfl: 0    sertraline (ZOLOFT) 50 mg tablet, Take 1 tablet (50 mg total) by mouth daily, Disp: 30 tablet, Rfl: 5    Current Allergies     Allergies as of 05/17/2022    (No Known Allergies)            The following portions of the patient's history were reviewed and updated as appropriate: allergies, current medications, past family history, past medical history, past social history, past surgical history and problem list      Past Medical History:   Diagnosis Date    Asthma     Extrinsic asthma with acute exacerbation   Onset date: 10/10/2011     Lymphadenopathy     Last assessed 6/18/2008    Mastodynia     Last assessed 6/15/2009     Neoplasm of uncertain behavior of skin     Last assessed 9/13/2011     Open wound of hand     Resolved 9/11/2014 Past Surgical History:   Procedure Laterality Date    HYSTERECTOMY      NASAL SEPTUM SURGERY         Family History   Problem Relation Age of Onset    Rheum arthritis Mother     Hypothyroidism Mother          Medications have been verified  Objective   Pulse 88   Temp 97 9 °F (36 6 °C)   Resp 18   Ht 5' 5" (1 651 m)   Wt 69 4 kg (153 lb)   LMP 06/13/2013 (Approximate)   BMI 25 46 kg/m²        Physical Exam     Physical Exam  Vitals and nursing note reviewed  Constitutional:       General: She is not in acute distress  Appearance: Normal appearance  She is not toxic-appearing  HENT:      Head: Normocephalic and atraumatic  Right Ear: External ear normal       Left Ear: External ear normal    Eyes:      Conjunctiva/sclera: Conjunctivae normal    Cardiovascular:      Rate and Rhythm: Normal rate  Pulses: Normal pulses  Pulmonary:      Effort: Pulmonary effort is normal    Skin:     General: Skin is warm  Capillary Refill: Capillary refill takes less than 2 seconds  Findings: Abrasion present  Comments: Approximately 3 cm circumferential abrasion of lateral aspect of left knee, mild surrounding erythema slightly past margin of previously placed skin marker, no streaking, no wound discharge or drainage, no leg swelling, left calf 36 cm in circumference right calf 35 cm in circumference  Full ROM of left knee intact, gross sensation intact, 2+ posterior tibialis and dorsalis pedis pulses of left leg  Neurological:      General: No focal deficit present  Mental Status: She is alert and oriented to person, place, and time

## 2022-05-26 ENCOUNTER — ANNUAL EXAM (OUTPATIENT)
Dept: FAMILY MEDICINE CLINIC | Facility: CLINIC | Age: 52
End: 2022-05-26
Payer: COMMERCIAL

## 2022-05-26 VITALS
OXYGEN SATURATION: 98 % | BODY MASS INDEX: 25.33 KG/M2 | HEART RATE: 80 BPM | HEIGHT: 65 IN | WEIGHT: 152 LBS | DIASTOLIC BLOOD PRESSURE: 80 MMHG | SYSTOLIC BLOOD PRESSURE: 124 MMHG | RESPIRATION RATE: 16 BRPM | TEMPERATURE: 98.3 F

## 2022-05-26 DIAGNOSIS — Z01.419 ENCOUNTER FOR ANNUAL ROUTINE GYNECOLOGICAL EXAMINATION: Primary | ICD-10-CM

## 2022-05-26 DIAGNOSIS — Z12.11 ENCOUNTER FOR SCREENING COLONOSCOPY: ICD-10-CM

## 2022-05-26 PROCEDURE — 99396 PREV VISIT EST AGE 40-64: CPT | Performed by: OBSTETRICS & GYNECOLOGY

## 2022-05-30 LAB
CYTOLOGIST CVX/VAG CYTO: ABNORMAL
DX ICD CODE: ABNORMAL
HPV I/H RISK 4 DNA CVX QL PROBE+SIG AMP: POSITIVE
Lab: ABNORMAL
OTHER STN SPEC: ABNORMAL
PATH REPORT.FINAL DX SPEC: ABNORMAL
SL AMB NOTE:: ABNORMAL
SL AMB SPECIMEN ADEQUACY: ABNORMAL
SL AMB TEST METHODOLOGY: ABNORMAL

## 2022-05-30 NOTE — PROGRESS NOTES
Assessment/Plan     1  Encounter for annual routine gynecological examination  - Pap Smear with HPV performed this visit, await results  - Patient has appointment for mammogram on June 14th  - Will send colonoscopy referral  - Continue weight bearing exercise  - Return to clinic for annual physical and other care gap closures    2  Encounter for screening colonoscopy  - Ambulatory referral for colonoscopy      Hollis Ackerman is a 54 y o  female who presents for GYN annual exam  The patient has no complaints today  The patient is not currently sexually active  GYN screening history: last pap: was normal  Patient denies post-menopausal vaginal bleeding  The patient participates in regular exercise: yes  Menstrual History:  OB History    No obstetric history on file  Patient's last menstrual period was 06/13/2013 (approximate)  The following portions of the patient's history were reviewed and updated as appropriate:     Patient Active Problem List    Diagnosis Date Noted    Otalgia 12/04/2018    Disorder of both eustachian tubes 12/04/2018    Upper respiratory tract infection 09/21/2018    Acute non-recurrent pansinusitis 09/21/2018    Mild intermittent asthma without complication 25/13/6665    Mild nausea 01/23/2018    Abnormal mammogram of right breast 12/04/2017    Situational anxiety 10/04/2017    Diffuse nontoxic goiter 01/12/2016    Herpes gingivostomatitis 05/06/2015    Asthma with exacerbation 09/11/2014    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    Asthma 11/06/2012    Anxiety disorder 09/04/2012    Depression 09/04/2012    Hypothyroidism 09/04/2012    Acute maxillary sinusitis 09/04/2012    Acute upper respiratory infection 09/04/2012    Allergic rhinitis 09/04/2012    Amenorrhea 09/04/2012    Herpes simplex infection 09/04/2012     She  has a past surgical history that includes Hysterectomy and Nasal septum surgery    Her family history includes Hypothyroidism in her mother; Rheum arthritis in her mother  She  reports that she has been smoking  She has never used smokeless tobacco  She reports current alcohol use  She reports that she does not use drugs  Current Outpatient Medications   Medication Sig Dispense Refill    albuterol (PROVENTIL HFA,VENTOLIN HFA) 90 mcg/act inhaler Inhale 2 puffs  as needed in the morning and 2 puffs as needed at noon and 2 puffs as needed in the evening and 2 puffs as needed before bedtime   levocetirizine (XYZAL) 5 MG tablet Take by mouth daily      levothyroxine (Euthyrox) 50 mcg tablet Take 1 tablet (50 mcg total) by mouth daily in the early morning 60 tablet 1    levothyroxine 200 mcg tablet Take 1 tablet (200 mcg total) by mouth daily in the early morning 60 tablet 1    mupirocin (BACTROBAN) 2 % ointment Apply topically 3 (three) times a day 22 g 0    sertraline (ZOLOFT) 50 mg tablet Take 1 tablet (50 mg total) by mouth daily 30 tablet 5     No current facility-administered medications for this visit  She has No Known Allergies       Review of Systems   Constitutional: Negative for chills and fever  HENT: Negative for sore throat  Eyes: Negative for visual disturbance  Respiratory: Negative for cough and shortness of breath  Cardiovascular: Negative for chest pain and palpitations  Gastrointestinal: Negative for abdominal pain and vomiting  Genitourinary: Negative for dysuria, hematuria, pelvic pain, vaginal bleeding and vaginal discharge  Musculoskeletal: Negative for arthralgias and back pain  Skin: Negative for rash  Neurological: Negative for seizures and syncope  All other systems reviewed and are negative        Objective      /80   Pulse 80   Temp 98 3 °F (36 8 °C)   Resp 16   Ht 5' 5" (1 651 m)   Wt 68 9 kg (152 lb)   LMP 06/13/2013 (Approximate)   SpO2 98%   BMI 25 29 kg/m²     General:   alert and oriented, in no acute distress   Heart: regular rate and rhythm, S1, S2 normal, no murmur, click, rub or gallop   Lungs: clear to auscultation bilaterally   Abdomen: soft, non-tender, without masses or organomegaly   Vulva: Normal, no rash or lesions   Vagina: normal discharge, minimal rugae and +lateral vaginal wall relaxation   Cervix: no bleeding following Pap, no cervical motion tenderness and no lesions   Uterus: surgically absent   Adnexa: Normal   Breast: Normal shape, no skin changes or nodules   No LAD       Teo Mcconnell MD  Family Medicine PGY-1

## 2022-06-08 PROBLEM — J01.40 ACUTE NON-RECURRENT PANSINUSITIS: Status: RESOLVED | Noted: 2018-09-21 | Resolved: 2022-06-08

## 2022-06-08 PROBLEM — R92.8 ABNORMAL MAMMOGRAM OF RIGHT BREAST: Status: RESOLVED | Noted: 2017-12-04 | Resolved: 2022-06-08

## 2022-06-08 PROBLEM — F41.8 SITUATIONAL ANXIETY: Status: RESOLVED | Noted: 2017-10-04 | Resolved: 2022-06-08

## 2022-06-08 PROBLEM — R11.0 MILD NAUSEA: Status: RESOLVED | Noted: 2018-01-23 | Resolved: 2022-06-08

## 2022-06-08 PROBLEM — H92.09 OTALGIA: Status: RESOLVED | Noted: 2018-12-04 | Resolved: 2022-06-08

## 2022-06-08 PROBLEM — J06.9 UPPER RESPIRATORY TRACT INFECTION: Status: RESOLVED | Noted: 2018-09-21 | Resolved: 2022-06-08

## 2022-06-14 ENCOUNTER — HOSPITAL ENCOUNTER (OUTPATIENT)
Dept: RADIOLOGY | Facility: HOSPITAL | Age: 52
Discharge: HOME | End: 2022-06-14
Payer: COMMERCIAL

## 2022-06-14 VITALS — WEIGHT: 148 LBS | HEIGHT: 65 IN | BODY MASS INDEX: 24.66 KG/M2

## 2022-06-14 DIAGNOSIS — Z12.31 ENCOUNTER FOR SCREENING MAMMOGRAM FOR MALIGNANT NEOPLASM OF BREAST: ICD-10-CM

## 2022-06-14 PROCEDURE — 77063 BREAST TOMOSYNTHESIS BI: CPT

## 2022-06-14 PROCEDURE — 77067 SCR MAMMO BI INCL CAD: CPT

## 2023-01-11 ENCOUNTER — OFFICE VISIT (OUTPATIENT)
Dept: URGENT CARE | Facility: CLINIC | Age: 53
End: 2023-01-11

## 2023-01-11 VITALS
DIASTOLIC BLOOD PRESSURE: 96 MMHG | SYSTOLIC BLOOD PRESSURE: 138 MMHG | HEART RATE: 80 BPM | OXYGEN SATURATION: 98 % | RESPIRATION RATE: 14 BRPM | WEIGHT: 146 LBS | TEMPERATURE: 99.1 F | BODY MASS INDEX: 24.3 KG/M2

## 2023-01-11 DIAGNOSIS — K52.9 GASTROENTERITIS: Primary | ICD-10-CM

## 2023-01-11 RX ORDER — ONDANSETRON 4 MG/1
4 TABLET, FILM COATED ORAL EVERY 8 HOURS PRN
Qty: 20 TABLET | Refills: 0 | Status: SHIPPED | OUTPATIENT
Start: 2023-01-11

## 2023-01-11 NOTE — PATIENT INSTRUCTIONS
Take Zofran as needed for nausea  Discussed diarrhea may likely be related to use frequent Mylanta  Discussed BRAT diet (Bananas, Rice, Applesauce, Toast)

## 2023-01-12 NOTE — PROGRESS NOTES
[facility]        NAME: Barry Gill is a 50 y o  female  : 1970    MRN: [id number]  DATE: 2023  TIME: 7:01 PM    Assessment and Plan   Gastroenteritis [K52 9]  1  Gastroenteritis  ondansetron (ZOFRAN) 4 mg tablet            Patient Instructions     Patient Instructions   Take Zofran as needed for nausea  Discussed diarrhea may likely be related to use frequent Mylanta  Discussed BRAT diet (Bananas, Rice, Applesauce, Toast)  Follow up with PCP in 3-5 days  Proceed to  ER if symptoms worsen  Chief Complaint     Chief Complaint   Patient presents with   • Vomiting     Pt presents with stomach discomfort started last week; vomit/ diarrhea; History of Present Illness       Patient is a 32-year-old female presenting today with diarrhea x1 week  Patient has past surgical history significant for hysterectomy  Patient notes that she had the flu last week, states that most of her symptoms have resolved but is still having some nausea and episodes of loose stools, notes 3-4 loose stools a day for the last few days, describes them as watery, has been tolerating fluids well but gets nauseous around eating  Patient also reports that she has been under an increased amount of stress over the last couple weeks taking care of her sick mother  Reports that she has been taking Mylanta on a regular basis which has not provided any relief of her nausea  Denies hematochezia, vomiting, lightheadedness, dizziness, fever  Review of Systems   Review of Systems   Constitutional: Negative for chills and fever  HENT: Negative for ear pain and sore throat  Eyes: Negative for pain and visual disturbance  Respiratory: Negative for cough and shortness of breath  Cardiovascular: Negative for chest pain and palpitations  Gastrointestinal: Positive for abdominal pain, diarrhea and nausea  Negative for vomiting  Genitourinary: Negative for dysuria and hematuria  Musculoskeletal: Negative for arthralgias and back pain  Skin: Negative for color change and rash  Neurological: Negative for seizures and syncope  All other systems reviewed and are negative  Current Medications       Current Outpatient Medications:   •  albuterol (PROVENTIL HFA,VENTOLIN HFA) 90 mcg/act inhaler, Inhale 2 puffs  as needed in the morning and 2 puffs as needed at noon and 2 puffs as needed in the evening and 2 puffs as needed before bedtime  , Disp: , Rfl:   •  levocetirizine (XYZAL) 5 MG tablet, Take by mouth daily, Disp: , Rfl:   •  levothyroxine (Euthyrox) 50 mcg tablet, Take 1 tablet (50 mcg total) by mouth daily in the early morning, Disp: 60 tablet, Rfl: 1  •  levothyroxine 200 mcg tablet, Take 1 tablet (200 mcg total) by mouth daily in the early morning, Disp: 60 tablet, Rfl: 1  •  ondansetron (ZOFRAN) 4 mg tablet, Take 1 tablet (4 mg total) by mouth every 8 (eight) hours as needed for nausea or vomiting, Disp: 20 tablet, Rfl: 0  •  sertraline (ZOLOFT) 50 mg tablet, Take 1 tablet (50 mg total) by mouth daily, Disp: 30 tablet, Rfl: 5  •  mupirocin (BACTROBAN) 2 % ointment, Apply topically 3 (three) times a day (Patient not taking: Reported on 1/11/2023), Disp: 22 g, Rfl: 0    Current Allergies     Allergies as of 01/11/2023   • (No Known Allergies)            The following portions of the patient's history were reviewed and updated as appropriate: allergies, current medications, past family history, past medical history, past social history, past surgical history and problem list      Past Medical History:   Diagnosis Date   • Abnormal mammogram of right breast 12/4/2017   • Acute non-recurrent pansinusitis 9/21/2018   • Asthma     Extrinsic asthma with acute exacerbation   Onset date: 10/10/2011    • Asthma with exacerbation 9/11/2014   • Herpes gingivostomatitis 5/6/2015   • Herpes simplex infection 9/4/2012    Procedure/Onset: 06/22/2010   • Lymphadenopathy     Last assessed 6/18/2008   • Mastodynia     Last assessed 6/15/2009    • Neoplasm of uncertain behavior of skin     Last assessed 9/13/2011    • Open wound of hand     Resolved 9/11/2014        Past Surgical History:   Procedure Laterality Date   • HYSTERECTOMY     • NASAL SEPTUM SURGERY         Family History   Problem Relation Age of Onset   • Rheum arthritis Mother    • Hypothyroidism Mother    • Prostate cancer Father 80   • Melanoma Father    • Ovarian cancer Sister 39         Medications have been verified  Objective   /96   Pulse 80   Temp 99 1 °F (37 3 °C)   Resp 14   Wt 66 2 kg (146 lb)   LMP 06/13/2013 (Approximate)   SpO2 98%   BMI 24 30 kg/m²        Physical Exam     Physical Exam  Vitals and nursing note reviewed  Constitutional:       General: She is not in acute distress  Appearance: Normal appearance  She is not ill-appearing  HENT:      Head: Normocephalic and atraumatic  Right Ear: Tympanic membrane, ear canal and external ear normal       Left Ear: Tympanic membrane, ear canal and external ear normal       Nose: Nose normal       Mouth/Throat:      Mouth: Mucous membranes are moist       Pharynx: Oropharynx is clear  Cardiovascular:      Rate and Rhythm: Normal rate and regular rhythm  Pulses: Normal pulses  Heart sounds: Normal heart sounds  Pulmonary:      Effort: Pulmonary effort is normal       Breath sounds: Normal breath sounds  Abdominal:      General: Abdomen is flat  Bowel sounds are normal  There is no distension  Palpations: Abdomen is soft  Tenderness: There is no guarding or rebound  Comments: Mild diffuse abdominal tenderness upon deep palpation in all quadrants   Skin:     General: Skin is warm  Capillary Refill: Capillary refill takes less than 2 seconds  Neurological:      Mental Status: She is alert

## 2023-01-13 DIAGNOSIS — F32.9 REACTIVE DEPRESSION: ICD-10-CM

## 2023-01-13 DIAGNOSIS — E03.9 ACQUIRED HYPOTHYROIDISM: ICD-10-CM

## 2023-01-13 RX ORDER — SERTRALINE HYDROCHLORIDE 25 MG/1
25 TABLET, FILM COATED ORAL DAILY
Qty: 30 TABLET | Refills: 0 | Status: SHIPPED | OUTPATIENT
Start: 2023-01-13

## 2023-01-13 RX ORDER — LEVOTHYROXINE SODIUM 0.2 MG/1
200 TABLET ORAL
Qty: 60 TABLET | Refills: 1 | Status: SHIPPED | OUTPATIENT
Start: 2023-01-13

## 2023-01-13 RX ORDER — LEVOTHYROXINE SODIUM 0.05 MG/1
50 TABLET ORAL
Qty: 60 TABLET | Refills: 1 | Status: SHIPPED | OUTPATIENT
Start: 2023-01-13

## 2023-01-13 NOTE — PROGRESS NOTES
Patient accompanied her mother OV and requested refills for levothyroxine and zoloft  She stopped taking zoloft months ago and wishes to restart  Will restart at lower dosage of 25 mg daily and have patient follow up on 1/30 for our next visit   She also requested TSH test

## 2023-01-23 ENCOUNTER — RA CDI HCC (OUTPATIENT)
Dept: OTHER | Facility: HOSPITAL | Age: 53
End: 2023-01-23

## 2023-01-23 NOTE — PROGRESS NOTES
[facility]  coding opportunities          Chart Reviewed number of suggestions sent to Provider: 1     Patients Insurance        Commercial Insurance: [employer]     [id number] [id number]

## 2023-05-09 ENCOUNTER — OFFICE VISIT (OUTPATIENT)
Dept: FAMILY MEDICINE CLINIC | Facility: CLINIC | Age: 53
End: 2023-05-09

## 2023-05-09 VITALS
OXYGEN SATURATION: 98 % | HEART RATE: 95 BPM | DIASTOLIC BLOOD PRESSURE: 72 MMHG | HEIGHT: 65 IN | SYSTOLIC BLOOD PRESSURE: 120 MMHG | WEIGHT: 143.8 LBS | BODY MASS INDEX: 23.96 KG/M2 | RESPIRATION RATE: 16 BRPM

## 2023-05-09 DIAGNOSIS — E03.9 ACQUIRED HYPOTHYROIDISM: ICD-10-CM

## 2023-05-09 DIAGNOSIS — R87.810 CERVICAL HIGH RISK HPV (HUMAN PAPILLOMAVIRUS) TEST POSITIVE: ICD-10-CM

## 2023-05-09 DIAGNOSIS — F33.1 MODERATE EPISODE OF RECURRENT MAJOR DEPRESSIVE DISORDER (HCC): Primary | ICD-10-CM

## 2023-05-09 NOTE — PROGRESS NOTES
Ezekiel Oliva 1970 female MRN: [id number]    [facility] Medicine Acute Visit    ASSESSMENT/PLAN  1  Moderate episode of recurrent major depressive disorder (HCC)  • MDD in setting of caretaker burnout and poor compliance with hypothyroidism treatment  • Patient wants to restart medication and also agrees to do psychotherapy  • Will restart patient on Zoloft 25 mg daily and if tolerating well will increase to 50 mg at her next visit  • Will also refer to behavioral therapy  • Patient was advised that the use of SSRIs can cause side effects such as GI disturbances and increased risk of suicidal thoughts  • She was advised to monitor for side effects of suicidal thoughts  • Follow up in 3-4 weeks  - Ambulatory Referral to [facility]rapists; Future    2  Acquired hypothyroidism  · Continue levothyroxine at current dosage but patient was strongly recommended to check her TSH as soon as possible  · She has not had her TSH checked since 4/19/2022 and at that time the TSH was 133  · Encourage patient to be compliant with her levothyroxine given that missing a dose will cause her symptoms to return and/or worsen  · Further changes to levothyroxine dosage pending TSH check  3  Cervical high risk HPV (human papillomavirus) test positive  · Has history of negative Pap smear with positive high-risk HPV which was done on 5/26/2022 and per guidelines she would be due for a repeat Pap smear this year  · She continues to smoke and she was made aware that the smoking is likely going to prevent her body from clearing the HPV  · I recommended that she return for a Pap smear with HPV cotesting in the month of June  · She states she will make an appointment for this         Future Appointments   Date Time Provider Arcadio Mosqueda   6/2/2023  2:20 PM Quirino Chance MD [facility]   6/30/2023  3:00 PM Ramy Stafford MD [facility]          SUBJECTIVE  CC: Follow-up      HPI:  Weston Kate is a 50 y o  female who presents for follow up of depression, hypothyroidism  Of note reviewing her previous labs she had positive HPV high risk present with a negative Pap smear  MDD  She is here to follow-up for depression  She states that recently she started working a part-time job in addition to her full-time job and that this has helped her mood improved because it keeps her out of the home  She has significant stressors at home given she has to take care of her mother who has dementia and she also has a healthy 25year-old who she needs to take care of at home  She denies suicidal/homicidal ideation and plan  She used to be on Zoloft but stated that she did not want to take it for too long but states that when she was on the medication she did feel better  She is open to seeing a behavioral health therapist in addition to the medication  She states that taking care of her mother has a great deal of stress to her life and worsens her depression  Herself and her mother will be traveling to [place] and she will be leaving her mother with a family member who will take care of her for 3 months so that she can have a few months of relief  She feels burnt out due to taking care of her mother and she also took care of her father before he passed away years ago  Hypothyroidism  She has had poor follow-up of her hypothyroidism due to missing office visits and states that she also misses levothyroxine dosages very frequently due to being busy at work and taking care of her of her mother  She has not had her TSH checked in over a year  She does note that when she misses her levothyroxine dosage she will feel that her depression worsens and feels that her energy levels decrease      Review of Systems    Historical Information   The patient history was reviewed as follows:  Past Medical History:   Diagnosis Date   • Abnormal mammogram of right breast 12/4/2017   • Acute non-recurrent pansinusitis 9/21/2018   • Asthma     Extrinsic asthma with acute exacerbation  Onset date: 10/10/2011    • Asthma with exacerbation 9/11/2014   • Herpes gingivostomatitis 5/6/2015   • Herpes simplex infection 9/4/2012    Procedure/Onset: 06/22/2010   • Lymphadenopathy     Last assessed 6/18/2008   • Mastodynia     Last assessed 6/15/2009    • Neoplasm of uncertain behavior of skin     Last assessed 9/13/2011    • Open wound of hand     Resolved 9/11/2014          Past Surgical History:   Procedure Laterality Date   • HYSTERECTOMY     • NASAL SEPTUM SURGERY       Family History   Problem Relation Age of Onset   • Rheum arthritis Mother    • Hypothyroidism Mother    • Prostate cancer Father 80   • Melanoma Father    • Ovarian cancer Sister 39      Social History   Social History     Substance and Sexual Activity   Alcohol Use Yes    Comment: social     Social History     Substance and Sexual Activity   Drug Use No     Social History     Tobacco Use   Smoking Status Every Day   • Types: Cigarettes   • Passive exposure: Never   Smokeless Tobacco Never       Medications:     Current Outpatient Medications:   •  albuterol (PROVENTIL HFA,VENTOLIN HFA) 90 mcg/act inhaler, Inhale 2 puffs  as needed in the morning and 2 puffs as needed at noon and 2 puffs as needed in the evening and 2 puffs as needed before bedtime  , Disp: , Rfl:   •  levocetirizine (XYZAL) 5 MG tablet, Take by mouth daily, Disp: , Rfl:   •  levothyroxine (Euthyrox) 50 mcg tablet, Take 1 tablet (50 mcg total) by mouth daily in the early morning, Disp: 60 tablet, Rfl: 1  •  levothyroxine 200 mcg tablet, Take 1 tablet (200 mcg total) by mouth daily in the early morning, Disp: 60 tablet, Rfl: 1  •  ondansetron (ZOFRAN) 4 mg tablet, Take 1 tablet (4 mg total) by mouth every 8 (eight) hours as needed for nausea or vomiting, Disp: 20 tablet, Rfl: 0  •  sertraline (ZOLOFT) 25 mg tablet, Take 1 tablet (25 mg total) by mouth daily, Disp: 30 tablet, "Rfl: 0  •  mupirocin (BACTROBAN) 2 % ointment, Apply topically 3 (three) times a day (Patient not taking: Reported on 1/11/2023), Disp: 22 g, Rfl: 0    No Known Allergies    OBJECTIVE  Vitals:   Vitals:    05/09/23 1357   BP: 120/72   Pulse: 95   Resp: 16   SpO2: 98%   Weight: 65 2 kg (143 lb 12 8 oz)   Height: 5' 5\" (1 651 m)         Physical Exam  Vitals reviewed  Constitutional:       General: She is awake  She is not in acute distress  Pulmonary:      Effort: No respiratory distress  Musculoskeletal:      Cervical back: Neck supple  Neurological:      Mental Status: She is alert and easily aroused  Psychiatric:         Attention and Perception: Attention and perception normal          Mood and Affect: Mood normal  Affect is tearful  Speech: Speech normal          Behavior: Behavior is cooperative              Macy Hyman MD  [facility]   5/9/2023    "

## 2023-05-10 ENCOUNTER — TELEPHONE (OUTPATIENT)
Dept: PSYCHIATRY | Facility: CLINIC | Age: 53
End: 2023-05-10

## 2023-05-24 ENCOUNTER — RA CDI HCC (OUTPATIENT)
Dept: OTHER | Facility: HOSPITAL | Age: 53
End: 2023-05-24

## 2023-05-24 NOTE — PROGRESS NOTES
[facility] 75  coding opportunities          Chart Reviewed number of suggestions sent to Provider: 1  [id number]     Patients Insurance        Commercial Insurance: [employer]

## 2024-09-11 ENCOUNTER — OFFICE VISIT (OUTPATIENT)
Dept: URGENT CARE | Facility: CLINIC | Age: 54
End: 2024-09-11
Payer: COMMERCIAL

## 2024-09-11 VITALS
RESPIRATION RATE: 20 BRPM | HEART RATE: 94 BPM | TEMPERATURE: 97.7 F | BODY MASS INDEX: 25.89 KG/M2 | OXYGEN SATURATION: 100 % | WEIGHT: 155.6 LBS | SYSTOLIC BLOOD PRESSURE: 128 MMHG | DIASTOLIC BLOOD PRESSURE: 88 MMHG

## 2024-09-11 DIAGNOSIS — Z20.822 CLOSE EXPOSURE TO COVID-19 VIRUS: Primary | ICD-10-CM

## 2024-09-11 LAB
SARS-COV-2 AG UPPER RESP QL IA: NEGATIVE
VALID CONTROL: NORMAL

## 2024-09-11 PROCEDURE — 87811 SARS-COV-2 COVID19 W/OPTIC: CPT | Performed by: PHYSICIAN ASSISTANT

## 2024-09-11 PROCEDURE — 99214 OFFICE O/P EST MOD 30 MIN: CPT | Performed by: PHYSICIAN ASSISTANT

## 2024-09-11 RX ORDER — ATORVASTATIN CALCIUM 40 MG/1
40 TABLET, FILM COATED ORAL DAILY
COMMUNITY

## 2024-09-11 RX ORDER — DILTIAZEM HYDROCHLORIDE 180 MG/1
180 CAPSULE, COATED, EXTENDED RELEASE ORAL DAILY
COMMUNITY

## 2024-09-11 RX ORDER — ASPIRIN 81 MG/1
81 TABLET, CHEWABLE ORAL DAILY
COMMUNITY

## 2024-09-11 RX ORDER — LEVOTHYROXINE SODIUM 150 UG/1
150 TABLET ORAL DAILY
COMMUNITY

## 2024-09-11 RX ORDER — OLMESARTAN MEDOXOMIL 20 MG/1
20 TABLET ORAL DAILY
COMMUNITY

## 2024-09-11 NOTE — PROGRESS NOTES
[facility]        NAME: Kenisha Garcia is a 54 y.o. female  : 1970    MRN: [id number]  DATE: 2024  TIME: 1:17 PM    Assessment and Plan   Close exposure to COVID-19 virus [Z20.822]  1. Close exposure to COVID-19 virus  Poct Covid 19 Rapid Antigen Test            Patient Instructions     Patient Instructions   NEGATIVE for COVID on the rapid test       Follow up with PCP in 3-5 days.  Proceed to  ER if symptoms worsen.    Chief Complaint     Chief Complaint   Patient presents with    COVID-19 Exposure     Pt here for concerns of Covid pt states  no s/s  pt states her daughter is positive.          History of Present Illness       The pt is a 54-year-old F presenting today after being exposed to COVID. 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 No current symptoms.         Review of Systems   Review of Systems   Constitutional:  Negative for activity change, appetite change, chills, fatigue and fever.   HENT:  Negative for congestion, ear pain, rhinorrhea, sinus pressure, sinus pain and sore throat.    Eyes:  Negative for pain and visual disturbance.   Respiratory:  Negative for cough, chest tightness and shortness of breath.    Cardiovascular:  Negative for chest pain and palpitations.   Gastrointestinal:  Negative for abdominal pain, diarrhea, nausea and vomiting.   Genitourinary:  Negative for dysuria and hematuria.   Musculoskeletal:  Negative for arthralgias, back pain and myalgias.   Skin:  Negative for color change, pallor and rash.   Neurological:  Negative for seizures, syncope and headaches.   All other systems reviewed and are negative.        Current Medications       Current Outpatient Medications:     albuterol (PROVENTIL HFA,VENTOLIN HFA) 90 mcg/act inhaler, Inhale 2 puffs  as needed in the morning and 2 puffs as needed at noon and 2 puffs as needed in the evening and 2 puffs as needed before bedtime., Disp: , Rfl:     aspirin 81 mg chewable  tablet, Chew 81 mg daily, Disp: , Rfl:     atorvastatin (LIPITOR) 40 mg tablet, Take 40 mg by mouth daily, Disp: , Rfl:     diltiazem (CARDIZEM CD) 180 mg 24 hr capsule, Take 180 mg by mouth daily, Disp: , Rfl:     levocetirizine (XYZAL) 5 MG tablet, Take by mouth daily, Disp: , Rfl:     levothyroxine 150 mcg tablet, Take 150 mcg by mouth daily, Disp: , Rfl:     olmesartan (BENICAR) 20 mg tablet, Take 20 mg by mouth daily, Disp: , Rfl:     ondansetron (ZOFRAN) 4 mg tablet, Take 1 tablet (4 mg total) by mouth every 8 (eight) hours as needed for nausea or vomiting, Disp: 20 tablet, Rfl: 0    Current Allergies     Allergies as of 09/11/2024    (No Known Allergies)            The following portions of the patient's history were reviewed and updated as appropriate: allergies, current medications, past family history, past medical history, past social history, past surgical history and problem list.     Past Medical History:   Diagnosis Date    Abnormal mammogram of right breast 12/04/2017    Acute non-recurrent pansinusitis 09/21/2018    Asthma     Extrinsic asthma with acute exacerbation. Onset date: 10/10/2011     Asthma with exacerbation 09/11/2014    Herpes gingivostomatitis 05/06/2015    Herpes simplex infection 09/04/2012    Procedure/Onset: 06/22/2010    Lymphadenopathy     Last assessed 6/18/2008    Mastodynia     Last assessed 6/15/2009     Neoplasm of uncertain behavior of skin     Last assessed 9/13/2011     Open wound of hand     Resolved 9/11/2014     PVC (premature ventricular contraction)        Past Surgical History:   Procedure Laterality Date    HYSTERECTOMY      NASAL SEPTUM SURGERY         Family History   Problem Relation Age of Onset    Rheum arthritis Mother     Hypothyroidism Mother     Prostate cancer Father 82    Melanoma Father     Ovarian cancer Sister 45         Medications have been verified.        Objective   /88   Pulse 94   Temp 97.7 °F (36.5 °C)   Resp 20   Wt 70.6 kg (155 lb  9.6 oz)   LMP 06/13/2013 (Approximate)   SpO2 100%   BMI 25.89 kg/m²        Physical Exam     Physical Exam  Vitals and nursing note reviewed.   Constitutional:       General: She is not in acute distress.     Appearance: Normal appearance. She is well-developed and normal weight. She is not ill-appearing, toxic-appearing or diaphoretic.   HENT:      Head: Normocephalic and atraumatic.      Right Ear: Tympanic membrane, ear canal and external ear normal. No drainage, swelling or tenderness. No middle ear effusion. There is no impacted cerumen. Tympanic membrane is not erythematous.      Left Ear: Tympanic membrane, ear canal and external ear normal. No drainage, swelling or tenderness.  No middle ear effusion. There is no impacted cerumen. Tympanic membrane is not erythematous.      Nose: Nose normal. No congestion or rhinorrhea.      Mouth/Throat:      Mouth: Mucous membranes are moist. No oral lesions.      Pharynx: Oropharynx is clear. Uvula midline. No pharyngeal swelling, oropharyngeal exudate, posterior oropharyngeal erythema or uvula swelling.      Tonsils: No tonsillar exudate or tonsillar abscesses. 0 on the right. 0 on the left.   Eyes:      Extraocular Movements:      Right eye: Normal extraocular motion.      Left eye: Normal extraocular motion.      Conjunctiva/sclera: Conjunctivae normal.      Pupils: Pupils are equal, round, and reactive to light.   Cardiovascular:      Rate and Rhythm: Normal rate and regular rhythm.      Heart sounds: Normal heart sounds. No murmur heard.     No friction rub. No gallop.   Pulmonary:      Effort: Pulmonary effort is normal. No respiratory distress.      Breath sounds: Normal breath sounds. No stridor. No wheezing, rhonchi or rales.   Chest:      Chest wall: No tenderness.   Abdominal:      General: Abdomen is flat. Bowel sounds are normal. There is no distension.      Palpations: Abdomen is soft. There is no mass.      Tenderness: There is no abdominal tenderness.  There is no guarding or rebound.      Hernia: No hernia is present.   Musculoskeletal:         General: Normal range of motion.   Skin:     General: Skin is warm and dry.      Capillary Refill: Capillary refill takes less than 2 seconds.   Neurological:      Mental Status: She is alert.

## 2025-05-27 ENCOUNTER — OFFICE VISIT (OUTPATIENT)
Dept: URGENT CARE | Facility: CLINIC | Age: 55
End: 2025-05-27
Payer: COMMERCIAL

## 2025-05-27 ENCOUNTER — APPOINTMENT (OUTPATIENT)
Dept: RADIOLOGY | Facility: CLINIC | Age: 55
End: 2025-05-27
Attending: FAMILY MEDICINE
Payer: COMMERCIAL

## 2025-05-27 VITALS
DIASTOLIC BLOOD PRESSURE: 68 MMHG | WEIGHT: 150 LBS | SYSTOLIC BLOOD PRESSURE: 126 MMHG | OXYGEN SATURATION: 99 % | BODY MASS INDEX: 24.96 KG/M2 | HEART RATE: 72 BPM | TEMPERATURE: 98.1 F | RESPIRATION RATE: 18 BRPM

## 2025-05-27 DIAGNOSIS — S93.402A SPRAIN OF LEFT ANKLE, UNSPECIFIED LIGAMENT, INITIAL ENCOUNTER: Primary | ICD-10-CM

## 2025-05-27 DIAGNOSIS — S83.412A SPRAIN OF MEDIAL COLLATERAL LIGAMENT OF LEFT KNEE, INITIAL ENCOUNTER: ICD-10-CM

## 2025-05-27 DIAGNOSIS — S93.402A SPRAIN OF LEFT ANKLE, UNSPECIFIED LIGAMENT, INITIAL ENCOUNTER: ICD-10-CM

## 2025-05-27 PROCEDURE — 73610 X-RAY EXAM OF ANKLE: CPT

## 2025-05-27 PROCEDURE — 73564 X-RAY EXAM KNEE 4 OR MORE: CPT

## 2025-05-27 PROCEDURE — 99213 OFFICE O/P EST LOW 20 MIN: CPT | Performed by: FAMILY MEDICINE

## 2025-05-27 RX ORDER — DILTIAZEM HYDROCHLORIDE 180 MG/1
CAPSULE, EXTENDED RELEASE ORAL
COMMUNITY
Start: 2025-05-16

## 2025-05-27 RX ORDER — DILTIAZEM HYDROCHLORIDE 180 MG/1
CAPSULE, EXTENDED RELEASE ORAL
COMMUNITY
Start: 2025-04-14

## 2025-05-27 NOTE — LETTER
May 27, 2025     Patient: Kenisha Garcia   YOB: 1970   Date of Visit: 5/27/2025       To Whom it May Concern:    Kenisha Garcia was seen in my clinic on 5/27/2025. Please excuse from work for 5/27, 5/28, and 5/29.    If you have any questions or concerns, please don't hesitate to call.         Sincerely,          Kasia Morales MD

## 2025-05-27 NOTE — PATIENT INSTRUCTIONS
Xrays of the knee and ankle show no acute abnormalities  Ace wraps applied to the ankle and knee for comfort and support, use as directed.   Air cast cast applied to the ankle, to be used as directed.   Patient is to rest the leg, elevate it, and apply ice to the site.   May take Tylenol or Ibuprofen as needed for pain.   Advised to use a muscle rub such as Bengay or Icy Hot rub as needed.  Referral provided to Orthopedics for further evaluation and treatment.

## 2025-05-27 NOTE — Clinical Note
May 27, 2025     Patient: Kenisha Garcia   YOB: 1970   Date of Visit: 5/27/2025       To Whom It May Concern:    It is my medical opinion that Kenisha Garcia {Work release (duty restriction):[id number]}.    If you have any questions or concerns, please don't hesitate to call.         Sincerely,        Kasia Morales MD    CC: No Recipients

## 2025-05-28 PROBLEM — S93.402A SPRAIN OF LEFT ANKLE, UNSPECIFIED LIGAMENT, INITIAL ENCOUNTER: Status: ACTIVE | Noted: 2025-05-28

## 2025-05-29 NOTE — PROGRESS NOTES
[facility]        NAME: Kenisha Garcia is a 54 y.o. female  : 1970    MRN: [id number]  DATE: May 27, 2025  TIME: 11:19 PM    Assessment and Plan   Sprain of left ankle, unspecified ligament, initial encounter [S93.402A]  1. Sprain of left ankle, unspecified ligament, initial encounter  XR ankle 3+ vw left    Ambulatory referral to Orthopedic Surgery    Ankle Air Cast      2. Sprain of medial collateral ligament of left knee, initial encounter  XR knee 4+ vw left injury    Ambulatory referral to Orthopedic Surgery        Patient Instructions     Patient Instructions   Xrays of the knee and ankle show no acute abnormalities  Ace wraps applied to the ankle and knee for comfort and support, use as directed.   Air cast cast applied to the ankle, to be used as directed.   Patient is to rest the leg, elevate it, and apply ice to the site.   May take Tylenol or Ibuprofen as needed for pain.   Advised to use a muscle rub such as Bengay or Icy Hot rub as needed.  Referral provided to Orthopedics for further evaluation and treatment.     Follow up with PCP in 3-5 days.  Proceed to  ER if symptoms worsen.    If tests have been performed at [facility], our office will contact you with results if changes need to be made to the care plan discussed with you at the visit.  [facility]    Chief Complaint     Chief Complaint   Patient presents with    Fall     Pt here for left knee and ankle pain after falling  while walking her dog 2 days ago. Pt states swelling in ankle and left knee, pain 6/10. Pain in ankle increases when standing. Pt used Tylenol. Pt wants note for work. Pt states she did not hit her head.     History of Present Illness     53 yo female presents for injuries of the left knee and ankle that occurred while at home 2 days ago. Patient states she was walking her dog and tripped over a branch causing her to fall to the ground. Patient states she fell onto her  left side and states she twisted her left knee and left ankle during the fall. She denies any hitting of head or LOC. Since then she is experiencing pain in the left knee and pain and swelling of the left ankle joint. No bruising noted. No wounds or bleeding. She experiences pain w/ movement of the knee and ankle joints, and pain in the knee and ankle w/ walking. No numbness/tingling or weakness of the leg or foot. She has taken Tylenol for the pain. She has no known allergies. No other injuries or complaints. She denies any hip, thigh, calf, or foot pain.      Review of Systems   Review of Systems   Constitutional: Negative.    Respiratory: Negative.     Cardiovascular: Negative.    Musculoskeletal:         As noted in HPI   Skin: Negative.    Allergic/Immunologic: Negative.    Neurological: Negative.    Hematological: Negative.      Current Medications     Current Medications[1]    Current Allergies     Allergies as of 05/27/2025    (No Known Allergies)            The following portions of the patient's history were reviewed and updated as appropriate: allergies, current medications, past family history, past medical history, past social history, past surgical history and problem list.     Past Medical History[2]    Past Surgical History[3]    Family History[4]      Medications have been verified.        Objective   /68 (Patient Position: Sitting, Cuff Size: Standard)   Pulse 72   Temp 98.1 °F (36.7 °C) (Tympanic)   Resp 18   Wt 68 kg (150 lb)   LMP 06/13/2013 (Approximate)   SpO2 99%   BMI 24.96 kg/m²   Patient's last menstrual period was 06/13/2013 (approximate).       Physical Exam     Physical Exam  Vitals and nursing note reviewed.   Constitutional:       General: She is awake. She is not in acute distress.     Appearance: Normal appearance. She is well-developed and well-groomed. She is not ill-appearing, toxic-appearing or diaphoretic.     Cardiovascular:      Rate and Rhythm: Normal rate.       Pulses: Normal pulses.   Pulmonary:      Effort: Pulmonary effort is normal. No tachypnea, accessory muscle usage or respiratory distress.     Musculoskeletal:      Comments: Left foot and ankle: mild swelling of the medial aspect of the ankle. There is mild tenderness to palpation of the medial and lateral aspects of the ankle joint. Ankle joint ROM limited secondary to pain. No bruising or erythema present. Skin is appropriately warm and intact, no wounds. 2+ pedal pulses. Good capillary refill. Strength and sensations are intact.     Left knee/lower leg: no swelling, erythema, bruising, or wounds. Skin is appropriately warm and intact. There is tenderness to palpation along the medial aspect of the joint line. Knee has full ROM, however pt experiences pain w/ movement. Normal gait.     Skin:     General: Skin is warm and dry.      Capillary Refill: Capillary refill takes less than 2 seconds.      Coloration: Skin is not pale.      Findings: No abrasion, abscess, bruising, erythema, rash or wound.     Neurological:      General: No focal deficit present.      Mental Status: She is alert and oriented to person, place, and time. Mental status is at baseline.      Sensory: Sensation is intact.      Motor: Motor function is intact.     Psychiatric:         Mood and Affect: Mood normal.         Behavior: Behavior normal. Behavior is cooperative.         Thought Content: Thought content normal.         Judgment: Judgment normal.                        [1]   Current Outpatient Medications:     albuterol (PROVENTIL HFA,VENTOLIN HFA) 90 mcg/act inhaler, Inhale 2 puffs as needed in the morning and 2 puffs as needed at noon and 2 puffs as needed in the evening and 2 puffs as needed before bedtime., Disp: , Rfl:     aspirin 81 mg chewable tablet, Chew 81 mg in the morning., Disp: , Rfl:     atorvastatin (LIPITOR) 40 mg tablet, Take 40 mg by mouth in the morning., Disp: , Rfl:     Dilt- MG 24 hr capsule, , Disp: , Rfl:      diltiazem (CARDIZEM CD) 180 mg 24 hr capsule, Take 180 mg by mouth in the morning., Disp: , Rfl:     diltiazem (TIAZAC) 180 MG 24 hr capsule, , Disp: , Rfl:     levocetirizine (XYZAL) 5 MG tablet, Take by mouth in the morning., Disp: , Rfl:     levothyroxine 150 mcg tablet, Take 150 mcg by mouth in the morning., Disp: , Rfl:     olmesartan (BENICAR) 20 mg tablet, Take 20 mg by mouth in the morning., Disp: , Rfl:     ondansetron (ZOFRAN) 4 mg tablet, Take 1 tablet (4 mg total) by mouth every 8 (eight) hours as needed for nausea or vomiting, Disp: 20 tablet, Rfl: 0  [2]   Past Medical History:  Diagnosis Date    Abnormal mammogram of right breast 12/04/2017    Acute non-recurrent pansinusitis 09/21/2018    Asthma     Extrinsic asthma with acute exacerbation. Onset date: 10/10/2011     Asthma with exacerbation 09/11/2014    Herpes gingivostomatitis 05/06/2015    Herpes simplex infection 09/04/2012    Procedure/Onset: 06/22/2010    Lymphadenopathy     Last assessed 6/18/2008    Mastodynia     Last assessed 6/15/2009     Neoplasm of uncertain behavior of skin     Last assessed 9/13/2011     Open wound of hand     Resolved 9/11/2014     PVC (premature ventricular contraction)    [3]   Past Surgical History:  Procedure Laterality Date    HYSTERECTOMY      NASAL SEPTUM SURGERY     [4]   Family History  Problem Relation Name Age of Onset    Rheum arthritis Mother      Hypothyroidism Mother      Prostate cancer Father  82    Melanoma Father      Ovarian cancer Sister  45

## 2025-06-09 ENCOUNTER — OFFICE VISIT (OUTPATIENT)
Dept: OBGYN CLINIC | Facility: CLINIC | Age: 55
End: 2025-06-09
Payer: COMMERCIAL

## 2025-06-09 VITALS — WEIGHT: 150 LBS | HEIGHT: 65 IN | BODY MASS INDEX: 24.99 KG/M2

## 2025-06-09 DIAGNOSIS — S93.402A SPRAIN OF UNSPECIFIED LIGAMENT OF LEFT ANKLE, INITIAL ENCOUNTER: Primary | ICD-10-CM

## 2025-06-09 DIAGNOSIS — S80.02XA CONTUSION OF LEFT KNEE, INITIAL ENCOUNTER: ICD-10-CM

## 2025-06-09 PROCEDURE — 99204 OFFICE O/P NEW MOD 45 MIN: CPT | Performed by: ORTHOPAEDIC SURGERY

## 2025-06-09 NOTE — PROGRESS NOTES
"Patient Name: Kenisha Garcia      : 1970       MRN: [id number]   Encounter Provider: Dragan Krishnan DO   Encounter Date: 25  Encounter department: [facility]         Assessment & Plan  Sprain of unspecified ligament of left ankle, initial encounter  Kenisha has left-sided medial ankle pain following an ankle inversion injury.  She may have a typical ankle sprain with some contusion along the medial aspect of the ankle as well as strain of the posterior tibialis tendon.  X-rays are unremarkable.  I recommended formal physical therapy and a lace up ankle brace.  Follow-up if pain persists or worsens over the next 3 weeks  Orders:    Ambulatory referral to Physical Therapy; Future    Contusion of left knee, initial encounter  She also has pain over the medial portion of the left knee consistent with a contusion.  While she has pain near the area of the MCL she does not have significant discomfort with any stress of the MCL itself on exam.  I recommended continued conservative measures and further imaging only if pain increases.                Follow-up:  No follow-ups on file.     _____________________________________________________  CHIEF COMPLAINT:  Chief Complaint   Patient presents with    Left Ankle - Pain       Height 5' 5\" (1.651 m), weight 68 kg (150 lb), last menstrual period 2013.  Pain Score:   3      SUBJECTIVE:  Kenisha Garcia is a 54 y.o. female who presents to the office for evaluation for left medial ankle pain and left medial knee pain.  She had a fall while walking her dog 2 weeks ago.  She went to urgent care at that time x-rays were unremarkable.  She was then referred to see us today.  She continues to have pain and discomfort over the medial aspect of the left ankle that seems to worsen with standing for long periods of time or walking.  Her ankle begins to throb and she feels sensation of numbness and tingling into the foot at times.  She can " ambulate without much discomfort but the pain seems to increase with increased activity.  She also has medial sided knee pain.  She denies any swelling or effusion in her left knee following the fall but did have a small bruise over the medial portion of the knee.  She has some abrasions following the fall as well.  She continues to have intermittent pain over the medial portion of the knee and no history of knee issue in the past.            PAST MEDICAL HISTORY:  Past Medical History[1]    PAST SURGICAL HISTORY:  Past Surgical History[2]    FAMILY HISTORY:  Family History[3]    SOCIAL HISTORY:  Social History[4]    MEDICATIONS:  Current Medications[5]    ALLERGIES:  Allergies[6]      _____________________________________________________  Review of Systems     Left Ankle Exam     Tenderness   The patient is experiencing tenderness in the lateral malleolus, medial malleolus and deltoid.   Swelling: mild    Range of Motion   Dorsiflexion:  normal   Plantar flexion:  normal   Eversion:  normal   Inversion:  normal     Muscle Strength   Dorsiflexion:  5/5   Plantar flexion:  5/5   Anterior tibial:  5/5   Posterior tibial:  4/5  Gastrocsoleus:  5/5  Peroneal muscle:  5/5    Tests   Anterior drawer: negative    Other   Erythema: absent  Sensation: normal  Pulse: present      Left Knee Exam     Tenderness   The patient is experiencing tenderness in the medial joint line and MCL.    Range of Motion   Extension:  normal   Flexion:  normal     Tests   Lin:  Medial - negative Lateral - negative  Varus: negative Valgus: negative  Lachman:  Anterior - negative        Other   Erythema: absent  Sensation: normal  Pulse: present  Swelling: none  Effusion: no effusion present             Physical Exam  Vitals and nursing note reviewed.   Constitutional:       Appearance: Normal appearance. She is well-developed.   HENT:      Head: Normocephalic and atraumatic.      Right Ear: External ear normal.      Left Ear: External ear  normal.     Eyes:      General: No scleral icterus.     Extraocular Movements: Extraocular movements intact.      Conjunctiva/sclera: Conjunctivae normal.       Cardiovascular:      Rate and Rhythm: Normal rate.   Pulmonary:      Effort: Pulmonary effort is normal. No respiratory distress.     Musculoskeletal:      Cervical back: Normal range of motion and neck supple.      Left knee: No effusion.      Instability Tests: Medial Lin test negative and lateral Lin test negative.      Comments: See Ortho exam     Skin:     General: Skin is warm and dry.     Neurological:      General: No focal deficit present.      Mental Status: She is alert and oriented to person, place, and time.     Psychiatric:         Behavior: Behavior normal.        _____________________________________________________  STUDIES REVIEWED:  I personally reviewed the pertinent images and my independent interpretation is as follows:  X-ray of the left knee from 5/27/2025 demonstrates no evidence of acute fracture or significant degenerative changes.  X-ray of the left ankle from 5/27/2025 demonstrates no evidence of acute fracture or significant degenerative changes.    PROCEDURES PERFORMED:  Procedures        This document was created using speech voice recognition software.   Grammatical errors, random word insertions, pronoun errors, and incomplete sentences are an occasional consequence of this system due to software limitations, ambient noise, and hardware issues.   Any formal questions or concerns about content, text, or information contained within the body of this dictation should be directly addressed to the provider for clarification.         [1]   Past Medical History:  Diagnosis Date    Abnormal mammogram of right breast 12/04/2017    Acute non-recurrent pansinusitis 09/21/2018    Asthma     Extrinsic asthma with acute exacerbation. Onset date: 10/10/2011     Asthma with exacerbation 09/11/2014    Herpes gingivostomatitis  05/06/2015    Herpes simplex infection 09/04/2012    Procedure/Onset: 06/22/2010    Lymphadenopathy     Last assessed 6/18/2008    Mastodynia     Last assessed 6/15/2009     Neoplasm of uncertain behavior of skin     Last assessed 9/13/2011     Open wound of hand     Resolved 9/11/2014     PVC (premature ventricular contraction)    [2]   Past Surgical History:  Procedure Laterality Date    HYSTERECTOMY      NASAL SEPTUM SURGERY     [3]   Family History  Problem Relation Name Age of Onset    Rheum arthritis Mother      Hypothyroidism Mother      Prostate cancer Father  82    Melanoma Father      Ovarian cancer Sister  45   [4]   Social History  Tobacco Use    Smoking status: Former     Types: Cigarettes     Passive exposure: Never    Smokeless tobacco: Never   Vaping Use    Vaping status: Never Used   Substance Use Topics    Alcohol use: Never     Comment: social    Drug use: No   [5]   Current Outpatient Medications:     albuterol (PROVENTIL HFA,VENTOLIN HFA) 90 mcg/act inhaler, Inhale 2 puffs as needed in the morning and 2 puffs as needed at noon and 2 puffs as needed in the evening and 2 puffs as needed before bedtime., Disp: , Rfl:     aspirin 81 mg chewable tablet, Chew 81 mg in the morning., Disp: , Rfl:     atorvastatin (LIPITOR) 40 mg tablet, Take 40 mg by mouth in the morning., Disp: , Rfl:     Dilt- MG 24 hr capsule, , Disp: , Rfl:     diltiazem (CARDIZEM CD) 180 mg 24 hr capsule, Take 180 mg by mouth in the morning., Disp: , Rfl:     diltiazem (TIAZAC) 180 MG 24 hr capsule, , Disp: , Rfl:     levocetirizine (XYZAL) 5 MG tablet, Take by mouth in the morning., Disp: , Rfl:     levothyroxine 150 mcg tablet, Take 150 mcg by mouth in the morning., Disp: , Rfl:     olmesartan (BENICAR) 20 mg tablet, Take 20 mg by mouth in the morning., Disp: , Rfl:     ondansetron (ZOFRAN) 4 mg tablet, Take 1 tablet (4 mg total) by mouth every 8 (eight) hours as needed for nausea or vomiting (Patient not taking: Reported  on 6/9/2025), Disp: 20 tablet, Rfl: 0  [6] No Known Allergies

## 2025-06-09 NOTE — ASSESSMENT & PLAN NOTE
Kenisha has left-sided medial ankle pain following an ankle inversion injury.  She may have a typical ankle sprain with some contusion along the medial aspect of the ankle as well as strain of the posterior tibialis tendon.  X-rays are unremarkable.  I recommended formal physical therapy and a lace up ankle brace.  Follow-up if pain persists or worsens over the next 3 weeks  Orders:    Ambulatory referral to Physical Therapy; Future

## 2025-06-30 ENCOUNTER — TELEPHONE (OUTPATIENT)
Dept: OBGYN CLINIC | Facility: CLINIC | Age: 55
End: 2025-06-30

## 2025-06-30 NOTE — TELEPHONE ENCOUNTER
LM letting her know she missed her appt with Dr. Krishnan and to call if she'd like it rescheduled.